# Patient Record
Sex: FEMALE | Race: WHITE | Employment: OTHER | URBAN - METROPOLITAN AREA
[De-identification: names, ages, dates, MRNs, and addresses within clinical notes are randomized per-mention and may not be internally consistent; named-entity substitution may affect disease eponyms.]

---

## 2017-01-19 ENCOUNTER — TRANSFERRED RECORDS (OUTPATIENT)
Dept: HEALTH INFORMATION MANAGEMENT | Facility: CLINIC | Age: 32
End: 2017-01-19

## 2017-01-26 ENCOUNTER — HOSPITAL ENCOUNTER (OUTPATIENT)
Dept: MAMMOGRAPHY | Facility: CLINIC | Age: 32
End: 2017-01-26
Attending: OBSTETRICS & GYNECOLOGY
Payer: COMMERCIAL

## 2017-01-26 ENCOUNTER — HOSPITAL ENCOUNTER (OUTPATIENT)
Dept: MAMMOGRAPHY | Facility: CLINIC | Age: 32
Discharge: HOME | End: 2017-01-26
Attending: OBSTETRICS & GYNECOLOGY | Admitting: OBSTETRICS & GYNECOLOGY
Payer: COMMERCIAL

## 2017-01-26 ENCOUNTER — TRANSFERRED RECORDS (OUTPATIENT)
Dept: HEALTH INFORMATION MANAGEMENT | Facility: CLINIC | Age: 32
End: 2017-01-26

## 2017-01-26 DIAGNOSIS — N63.10 LUMP OF RIGHT BREAST: ICD-10-CM

## 2017-01-26 PROCEDURE — 76642 ULTRASOUND BREAST LIMITED: CPT | Mod: RT

## 2017-01-26 PROCEDURE — G0204 DX MAMMO INCL CAD BI: HCPCS

## 2017-05-11 ENCOUNTER — TRANSFERRED RECORDS (OUTPATIENT)
Dept: HEALTH INFORMATION MANAGEMENT | Facility: CLINIC | Age: 32
End: 2017-05-11

## 2017-05-22 ENCOUNTER — OFFICE VISIT (OUTPATIENT)
Dept: FAMILY MEDICINE | Facility: CLINIC | Age: 32
End: 2017-05-22
Payer: COMMERCIAL

## 2017-05-22 VITALS
WEIGHT: 193 LBS | BODY MASS INDEX: 32.15 KG/M2 | TEMPERATURE: 97.8 F | DIASTOLIC BLOOD PRESSURE: 68 MMHG | HEART RATE: 115 BPM | SYSTOLIC BLOOD PRESSURE: 120 MMHG | HEIGHT: 65 IN | OXYGEN SATURATION: 98 %

## 2017-05-22 DIAGNOSIS — L08.9 SKIN INFECTION: Primary | ICD-10-CM

## 2017-05-22 PROCEDURE — 99213 OFFICE O/P EST LOW 20 MIN: CPT | Performed by: PHYSICIAN ASSISTANT

## 2017-05-22 RX ORDER — CEPHALEXIN 500 MG/1
500 CAPSULE ORAL 2 TIMES DAILY
Qty: 20 CAPSULE | Refills: 0 | Status: SHIPPED | OUTPATIENT
Start: 2017-05-22 | End: 2017-07-12

## 2017-05-22 NOTE — MR AVS SNAPSHOT
"              After Visit Summary   5/22/2017    Kenna Carballo    MRN: [id number]           Patient Information     Date Of Birth          1985        Visit Information        Provider Department      5/22/2017 11:20 AM Sarah Duran PA-C [facility]        Today's Diagnoses     Skin infection    -  1      Care Instructions    Continue to do warm soaks to help.     Take the antibiotic as prescribed for the next 10 days.      Please follow-up if symptoms are not improved as expected.  Be seen sooner if needed.          Follow-ups after your visit        Who to contact     If you have questions or need follow up information about today's clinic visit or your schedule please contact [facility] directly at [phone].  Normal or non-critical lab and imaging results will be communicated to you by Crocshart, letter or phone within 4 business days after the clinic has received the results. If you do not hear from us within 7 days, please contact the clinic through Crocshart or phone. If you have a critical or abnormal lab result, we will notify you by phone as soon as possible.  Submit refill requests through Wheely or call your pharmacy and they will forward the refill request to us. Please allow 3 business days for your refill to be completed.          Additional Information About Your Visit        MyChart Information     Wheely lets you send messages to your doctor, view your test results, renew your prescriptions, schedule appointments and more. To sign up, go to [email or web address] . Click on \"Log in\" on the left side of the screen, which will take you to the Welcome page. Then click on \"Sign up Now\" on the right side of the page.     You will be asked to enter the access code listed below, as well as some personal information. Please follow the directions to create your username and password.     Your access code is: [email or web address]  Expires: 8/20/2017 11:38 AM     Your " "access code will  in 90 days. If you need help or a new code, please call your [place] clinic or [phone].        Care EveryWhere ID     This is your Care EveryWhere ID. This could be used by other organizations to access your [place] medical records  [id number]        Your Vitals Were     Pulse Temperature Height Last Period Pulse Oximetry Breastfeeding?    115 97.8  F (36.6  C) (Oral) 5' 5\" (1.651 m) 05/10/2017 (Approximate) 98% No    BMI (Body Mass Index)                   32.12 kg/m2            Blood Pressure from Last 3 Encounters:   17 120/68   16 118/68   16 128/78    Weight from Last 3 Encounters:   17 193 lb (87.5 kg)   16 178 lb (80.7 kg)   16 178 lb (80.7 kg)              Today, you had the following     No orders found for display         Today's Medication Changes          These changes are accurate as of: 17 11:38 AM.  If you have any questions, ask your nurse or doctor.               Start taking these medicines.        Dose/Directions    cephALEXin 500 MG capsule   Commonly known as:  KEFLEX   Used for:  Skin infection   Started by:  Sarah Duran PA-C        Dose:  500 mg   Take 1 capsule (500 mg) by mouth 2 times daily   Quantity:  20 capsule   Refills:  0            Where to get your medicines      These medications were sent to [facility] [address] [place] [address] AT [facility] [address] & [address] [address]    Hours:  24-hours Phone:  [phone]     cephALEXin 500 MG capsule                Primary Care Provider Office Phone # Fax #    Trang Hang Alvares [phone]       [facility] [address]  [facility] [address]        Thank you!     Thank you for choosing [facility]  for your care. Our goal is always to provide you with excellent care. Hearing back from our patients is one way we can continue to improve our services. " Please take a few minutes to complete the written survey that you may receive in the mail after your visit with us. Thank you!             Your Updated Medication List - Protect others around you: Learn how to safely use, store and throw away your medicines at www.disposemymeds.org.          This list is accurate as of: 5/22/17 11:38 AM.  Always use your most recent med list.                   Brand Name Dispense Instructions for use    calcium carbonate 500 MG tablet    OS-TIAGO 500 mg [place]. Ca     Take 500 mg by mouth 2 times daily       cephALEXin 500 MG capsule    KEFLEX    20 capsule    Take 1 capsule (500 mg) by mouth 2 times daily       OMEGA-3 FISH OIL PO      Take 1 g by mouth daily       triamcinolone 0.1 % cream    KENALOG    80 g    Apply sparingly to affected area three times daily for 14 days.       UNABLE TO FIND      MEDICATION NAME: Unknown -- Birth Control Pill

## 2017-05-22 NOTE — PROGRESS NOTES
"  SUBJECTIVE:                                                    Kenna Carballo is a 31 year old female who presents to clinic today for the following health issues:      Concern - Finger problem     Onset: x1 week    Description:   Right middle finger --     Intensity: mild, moderate    Progression of Symptoms:  worsening and constant    Accompanying Signs & Symptoms:  Little discharge once after soaking, swollen, tender       Previous history of similar problem:   none    Precipitating factors:   Worsened by: pressure    Alleviating factors:  Improved by: soaked in dreft    Not sure what happened.     Therapies Tried and outcome: above      Patient reports that she has had a hangnail on her right middle finger that she thinks is now infected.  She says that it is tender to the touch.  She did get relief with a warm soak.  She reports that this started about one week ago.      Problem list and histories reviewed & adjusted, as indicated.  Additional history: as documented      ROS:  Constitutional, HEENT, cardiovascular, pulmonary, GI, , musculoskeletal, neuro, skin, endocrine and psych systems are negative, except as otherwise noted.    OBJECTIVE:                                                    /68 (BP Location: Left arm, Patient Position: Chair, Cuff Size: Adult Large)  Pulse 115  Temp 97.8  F (36.6  C) (Oral)  Ht 5' 5\" (1.651 m)  Wt 193 lb (87.5 kg)  LMP 05/10/2017 (Approximate)  SpO2 98%  Breastfeeding? No  BMI 32.12 kg/m2  Body mass index is 32.12 kg/(m^2).  GENERAL: healthy, alert and no distress  EYES: Eyes grossly normal to inspection, PERRL and conjunctivae and sclerae normal  MS: no gross musculoskeletal defects noted, no edema  SKIN: minimal redness on right middle finger along thumb side of nail bed.  No purulent drainage, no encapsulated or enclosed purulent fluid, mildly tender to touch.  No rash noted and no surrounding erythema.    NEURO: Normal strength and tone, mentation " intact and speech normal  PSYCH: mentation appears normal, affect normal/bright    Diagnostic Test Results:  none      ASSESSMENT/PLAN:                                                      Kenna was seen today for finger.    Diagnoses and all orders for this visit:    Skin infection  -     cephALEXin (KEFLEX) 500 MG capsule; Take 1 capsule (500 mg) by mouth 2 times daily      See Patient Instructions:  Continue to do warm soaks to help.     Take the antibiotic as prescribed for the next 10 days.      Please follow-up if symptoms are not improved as expected.  Be seen sooner if needed.          Sarah Duran PA-C    [facility] [place]

## 2017-05-22 NOTE — NURSING NOTE
"Chief Complaint   Patient presents with     Finger       Initial /68 (BP Location: Left arm, Patient Position: Chair, Cuff Size: Adult Large)  Pulse 115  Temp 97.8  F (36.6  C) (Oral)  Ht 5' 5\" (1.651 m)  Wt 193 lb (87.5 kg)  LMP 05/10/2017 (Approximate)  SpO2 98%  Breastfeeding? No  BMI 32.12 kg/m2 Estimated body mass index is 32.12 kg/(m^2) as calculated from the following:    Height as of this encounter: 5' 5\" (1.651 m).    Weight as of this encounter: 193 lb (87.5 kg).  Medication Reconciliation: complete   Csaba Mlnmarinak CMA    "

## 2017-05-22 NOTE — PATIENT INSTRUCTIONS
Continue to do warm soaks to help.     Take the antibiotic as prescribed for the next 10 days.      Please follow-up if symptoms are not improved as expected.  Be seen sooner if needed.

## 2017-06-28 ENCOUNTER — HOSPITAL PATHOLOGY (OUTPATIENT)
Dept: OTHER | Facility: CLINIC | Age: 32
End: 2017-06-28

## 2017-06-28 ENCOUNTER — TRANSFERRED RECORDS (OUTPATIENT)
Dept: HEALTH INFORMATION MANAGEMENT | Facility: CLINIC | Age: 32
End: 2017-06-28

## 2017-06-30 LAB — COPATH REPORT: NORMAL

## 2017-07-06 PROCEDURE — 00000346 ZZHCL STATISTIC REVIEW OUTSIDE SLIDES TC 88321: Performed by: OBSTETRICS & GYNECOLOGY

## 2017-07-12 ENCOUNTER — ONCOLOGY VISIT (OUTPATIENT)
Dept: ONCOLOGY | Facility: CLINIC | Age: 32
End: 2017-07-12
Attending: OBSTETRICS & GYNECOLOGY
Payer: COMMERCIAL

## 2017-07-12 VITALS
BODY MASS INDEX: 31.53 KG/M2 | DIASTOLIC BLOOD PRESSURE: 82 MMHG | TEMPERATURE: 98.7 F | HEART RATE: 89 BPM | OXYGEN SATURATION: 98 % | RESPIRATION RATE: 16 BRPM | WEIGHT: 189.25 LBS | HEIGHT: 65 IN | SYSTOLIC BLOOD PRESSURE: 136 MMHG

## 2017-07-12 DIAGNOSIS — F41.9 ANXIETY: Primary | ICD-10-CM

## 2017-07-12 PROCEDURE — 99205 OFFICE O/P NEW HI 60 MIN: CPT | Performed by: OBSTETRICS & GYNECOLOGY

## 2017-07-12 PROCEDURE — 99211 OFF/OP EST MAY X REQ PHY/QHP: CPT

## 2017-07-12 RX ORDER — LORAZEPAM 1 MG/1
1 TABLET ORAL ONCE
Qty: 6 TABLET | Refills: 0 | Status: SHIPPED | OUTPATIENT
Start: 2017-07-12 | End: 2017-07-12

## 2017-07-12 ASSESSMENT — PAIN SCALES - GENERAL: PAINLEVEL: NO PAIN (0)

## 2017-07-12 NOTE — MR AVS SNAPSHOT
"              After Visit Summary   7/12/2017    Kenna Carballo    MRN: [id number]           Patient Information     Date Of Birth          1985        Visit Information        Provider Department      7/12/2017 2:00 PM Jerzy Harkins MD [facility]        Today's Diagnoses     Anxiety    -  1       Follow-ups after your visit        Your next 10 appointments already scheduled     Oct 23, 2017 10:30 AM CDT   (Arrive by 10:15 AM)   Return Visit with Jerzy Harkins MD   [facility] ([facility])    [address] [place] [address] [phone]              Who to contact     If you have questions or need follow up information about today's clinic visit or your schedule please contact [facility] directly at [phone].  Normal or non-critical lab and imaging results will be communicated to you by Border Stylohart, letter or phone within 4 business days after the clinic has received the results. If you do not hear from us within 7 days, please contact the clinic through Border Stylohart or phone. If you have a critical or abnormal lab result, we will notify you by phone as soon as possible.  Submit refill requests through [employer] or call your pharmacy and they will forward the refill request to us. Please allow 3 business days for your refill to be completed.          Additional Information About Your Visit        MyChart Information     [employer] lets you send messages to your doctor, view your test results, renew your prescriptions, schedule appointments and more. To sign up, go to [email or web address] . Click on \"Log in\" on the left side of the screen, which will take you to the Welcome page. Then click on \"Sign up Now\" on the right side of the page.     You will be asked to enter the access code listed below, as well as some personal information. Please follow the directions to create " "your username and password.     Your access code is: [email or web address]  Expires: 2017 11:38 AM     Your access code will  in 90 days. If you need help or a new code, please call your [facility] or [phone].        Care EveryWhere ID     This is your Care EveryWhere ID. This could be used by other organizations to access your [place] medical records  [id number]        Your Vitals Were     Pulse Temperature Respirations Height Pulse Oximetry BMI (Body Mass Index)    89 98.7  F (37.1  C) (Tympanic) 16 1.651 m (5' 5\") 98% 31.49 kg/m2       Blood Pressure from Last 3 Encounters:   17 136/82   17 120/68   16 118/68    Weight from Last 3 Encounters:   17 85.8 kg (189 lb 4 oz)   17 87.5 kg (193 lb)   16 80.7 kg (178 lb)              Today, you had the following     No orders found for display         Today's Medication Changes          These changes are accurate as of: 17 11:59 PM.  If you have any questions, ask your nurse or doctor.               Start taking these medicines.        Dose/Directions    LORazepam 1 MG tablet   Commonly known as:  ATIVAN   Used for:  Anxiety        Dose:  1 mg   Take 1 tablet (1 mg) by mouth once for 1 dose Take 1 pill 30 mins prior to visit   Quantity:  6 tablet   Refills:  0         Stop taking these medicines if you haven't already. Please contact your care team if you have questions.     cephALEXin 500 MG capsule   Commonly known as:  KEFLEX                Where to get your medicines      Some of these will need a paper prescription and others can be bought over the counter.  Ask your nurse if you have questions.     Bring a paper prescription for each of these medications     LORazepam 1 MG tablet                Primary Care Provider Office Phone # Fax #    Trang Alvares [phone]       [facility] [address]  [facility] [address]        Equal Access to Services     RADHA HERNANDEZ AH: " Hadii aad ku hadanthonynolan Bravoali, watorieda luqadaha, qajaspreetta kaayan cortes, bert suzannabernice cuevafran demetrio. So [facility] [phone].    ATENCIÓN: Si avery kulkarni, tiene a walsh disposición servicios gratuitos de asistencia lingüística. Mtame al [phone].    We comply with applicable federal civil rights laws and [place] laws. We do not discriminate on the basis of race, color, national origin, age, disability sex, sexual orientation or gender identity.            Thank you!     Thank you for choosing [facility]  for your care. Our goal is always to provide you with excellent care. Hearing back from our patients is one way we can continue to improve our services. Please take a few minutes to complete the written survey that you may receive in the mail after your visit with us. Thank you!             Your Updated Medication List - Protect others around you: Learn how to safely use, store and throw away your medicines at www.disposemymeds.org.          This list is accurate as of: 7/12/17 11:59 PM.  Always use your most recent med list.                   Brand Name Dispense Instructions for use Diagnosis    calcium carbonate 1250 MG tablet    OS-TIAGO 500 mg [place]. Ca     Take 500 mg by mouth 2 times daily        LORazepam 1 MG tablet    ATIVAN    6 tablet    Take 1 tablet (1 mg) by mouth once for 1 dose Take 1 pill 30 mins prior to visit    Anxiety       OMEGA-3 FISH OIL PO      Take 1 g by mouth daily        triamcinolone 0.1 % cream    KENALOG    80 g    Apply sparingly to affected area three times daily for 14 days.    Eczema, unspecified eczema       UNABLE TO FIND      MEDICATION NAME: Unknown -- Birth Control Pill

## 2017-07-12 NOTE — NURSING NOTE
"Oncology Rooming Note    July 12, 2017 2:08 PM   Kenna Carballo is a 31 year old female who presents for:    Chief Complaint   Patient presents with     Oncology Clinic Visit     New patient Consult     Initial Vitals: /82  Pulse 89  Temp 98.7  F (37.1  C) (Tympanic)  Resp 16  Ht 1.651 m (5' 5\")  Wt 85.8 kg (189 lb 4 oz)  SpO2 98%  BMI 31.49 kg/m2 Estimated body mass index is 31.49 kg/(m^2) as calculated from the following:    Height as of this encounter: 1.651 m (5' 5\").    Weight as of this encounter: 85.8 kg (189 lb 4 oz). Body surface area is 1.98 meters squared.  No Pain (0) Comment: Data Unavailable   No LMP recorded.  Allergies reviewed: Yes  Medications reviewed: Yes    Medications: Medication refills not needed today.  Pharmacy [facility] entered into [employer]: [facility] [address] [place] [address] [address] AT [place] [address] & [facility]    Clinical concerns: New Patient     8 minutes for nursing intake (face to face time)     Arlette Puri CMA     DISCHARGE PLAN:  Next appointments: See patient instruction section  Departure Mode: Ambulatory  Accompanied by: self  0 minutes for nursing discharge (face to face time)   Arlette Puri CMA                  "

## 2017-07-12 NOTE — PROGRESS NOTES
Consult Notes on Referred Patient    Date: 2017       Dr. Jerri Davison MD  [facility]  [address] [address]       RE: Kenna Carballo  : 1985  CHERRY: 2017    Dear Dr. Jerri Davison:    I had the pleasure of seeing your patient Kenna Carballo here at the Gynecologic Cancer Clinic at the [facility] on 2017.  As you know she is a very pleasant 31 year old woman with a recent diagnosis of  Cervical dysplasia and AIS.  Given these findings she was subsequently sent to the Gynecologic Cancer Clinic for new patient consultation.     She presented most recently in 2017 for a routine health examination and a question of removing her Nexplanon implanted birth control.  Her examination was otherwise unremarkable with the exception of a history of a mother who  at 38 with breast cancer she has been tested for BRCA which was negative she nevertheless gets yearly mammograms.    Also her prior Pap smear was HPV positive and was retested in January.      LEEP 2017: PATH   FINAL DIAGNOSIS:   Cervix, LEEP:   - Ectocervix, transformation zone and endocervix present.   - High grade squamous intraepithelial lesion (VAISHNAVI 2 and VAISHNAVI 3/moderate   and severe dysplasia) with extension into endocervical glands and   involving all four quadrants.   - Endocervical adenocarcinoma in situ, small focus.   - Endocervical margin positive for high grade squamous intraepithelial   lesion.   - Ectocervical margin close to dysplastic epithelium.     She is interested in pursuing fertility and was planning to get pregnant in February of next year.      Review of Systems:    Systemic           no weight changes; no fever; no chills; no night sweats; no appetite changes  Skin           no rashes, or lesions  Eye           no irritation; no changes in vision  Cherise-Laryngeal           no dysphagia; no hoarseness   Pulmonary    no cough; no  shortness of breath  Cardiovascular    no chest pain; no palpitations  Gastrointestinal    no diarrhea; no constipation; no abdominal pain; no changes in bowel  habits; no blood in stool  Genitourinary   no urinary frequency; no urinary urgency; no dysuria; no pain; no abnormal vaginal discharge; no abnormal vaginal bleeding  Breast   no breast discharge; no breast changes; no breast pain  Musculoskeletal    no myalgias; no arthralgias; no back pain  Psychiatric           no depressed mood; no anxiety    Hematologic           no tender lymph nodes; no noticeable swellings or lumps   Endocrine    no hot flashes; no heat/cold intolerance         Neurological   no tremor; no numbness and tingling; no headaches; no difficulty  sleeping      Past Medical History:    Past Medical History:   Diagnosis Date     SVT (supraventricular tachycardia) (H)     s/p ablation         Past Surgical History:    Past Surgical History:   Procedure Laterality Date     CARDIAC SURGERY      ablation for svt      SECTION N/A 2015    Procedure:  SECTION;  Surgeon: Coco Cross MD;  Location: [facility]         Health Maintenance:  Health Maintenance Due   Topic Date Due     PAP SCREENING Q3 YR (SYSTEM ASSIGNED)  10/16/2004       Last Pap Smear:               Result: abnormal: see above  She has had a history of abnormal Pap smears.    Current Medications:     has a current medication list which includes the following prescription(s): UNABLE TO FIND, cephalexin, triamcinolone, calcium carbonate, and omega-3 fatty acids.       Allergies:        Allergies   Allergen Reactions     Bee Pollen Other (See Comments)     PN: Swelling at sting site     Latex             Social History:     Social History   Substance Use Topics     Smoking status: Never Smoker     Smokeless tobacco: Never Used     Alcohol use 0.0 oz/week     0 Standard drinks or equivalent per week      Comment: 2-4 per wk       History   Drug Use No  "          Family History:     The patient's family history is notable for .    Family History   Problem Relation Age of Onset     Breast Cancer Mother    anlso AUNT (65), paternal GM 45,      Physical Exam:     PS: 0  VS: /82  Pulse 89  Temp 98.7  F (37.1  C) (Tympanic)  Resp 16  Ht 1.651 m (5' 5\")  Wt 85.8 kg (189 lb 4 oz)  SpO2 98%  BMI 31.49 kg/m2    General Appearance: healthy and alert, no distress     HEENT:  no thyromegaly, no palpable nodules or masses        Cardiovascular: regular rate and rhythm, no gallops, rubs or murmurs     Respiratory: lungs clear, no rales, rhonchi or wheezes, normal diaphragmatic excursion    Musculoskeletal: extremities non tender and without edema    Skin: no lesions or rashes     Neurological: normal gait, no gross defects     Psychiatric: appropriate mood and affect                               Hematological: normal cervical, supraclavicular and inguinal lymph nodes     Gastrointestinal:       abdomen soft, non-tender, non-distended, no organomegaly or masses    Genitourinary: External genitalia and urethral meatus appears normal.  Vagina is smooth without nodularity or masses.  Cervix appears grossly normal and healing without significant event.  Bimanual exam reveal no masses, nodularity or fullness.  There is no parametrial involvement.  Recto-vaginal exam confirms these findings.      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     A total of 60 minutes was spent with the patient, 40 minutes of which were spent in counseling the patient and/or treatment planning.      Plan:     1.)   Cervical dysplasia/ASI: We had a lengthy discussion today regarding the options patient understands that with a positive margin on the LEEP specimen that it is unclear whether the disease was completely resected. The cauterized edge may have been obliterated during the " procedure or it is possible that there is additional residual disease.  This is especially relevant for AIS lesions which are no serious for skip lesions.    We therefore discussed options including hysterectomy repeat conization and observation. The patient understands that in the absence of hysterectomy complete excision mass cannot be definitively proven. A while additional resection of the transformation zone may exclude the possibility of additional squamous dysplasia at the would not eliminate the possibility of residual AIS.    After a conference discussion of the risks benefits and alternatives of hysterectomy repeat conization observation patient is inclined towards observation in this setting I have recommended a colposcopy with ECC be performed on a q.3 month basis until she becomes pregnant.    She is currently on birth control pills and is not actively attempting to become pregnant which is reasonable. She does understand that there is a small risk for progressive disease in this context. She further understands that the treatment for this is generally hysterectomy or radical hysterectomy and therefore the the actual operative strategy may not change from a prevention to treatment standpoint. She does understand that in the setting of occult adenocarcinoma in particular there could be progressive disease that is not being recognized which ultimately could negatively affect prognosis.    At the conclusion of the discussion she seems comfortable with the idea of observation but certainly is going to reconsider these options over time did discuss briefly the possibility of adoption and subsequent setting as well       2.) Genetic risk factors were assessed and the patient does not meet the qualifications for a referral.  This patient's mother was tested for BRCA given her early-onset multifocal breast cancer. She will the patient believes that she was tested with a BRCA 1 and 2 analysis and not a BRCA  panel screen. I therefore recommended she consider retesting or having her mother retested for a panel of BRCA related mutations.    3.) Labs and/or tests ordered include:  none.     4.) Health maintenance issues addressed today include none.    Thank you for allowing us to participate in the care of your patient.         Sincerely,    Jerzy Harkins        Patient Care Team:  Trang Alvares MD as PCP - General (OB/Gyn)  FRANKO RAMIREZ

## 2017-07-14 LAB — COPATH REPORT: NORMAL

## 2017-10-14 ENCOUNTER — HEALTH MAINTENANCE LETTER (OUTPATIENT)
Age: 32
End: 2017-10-14

## 2017-10-20 ASSESSMENT — ENCOUNTER SYMPTOMS
SKIN CHANGES: 0
NAIL CHANGES: 0
HOT FLASHES: 0
DECREASED LIBIDO: 0
POOR WOUND HEALING: 0

## 2017-10-23 ENCOUNTER — ONCOLOGY VISIT (OUTPATIENT)
Dept: ONCOLOGY | Facility: CLINIC | Age: 32
End: 2017-10-23
Attending: OBSTETRICS & GYNECOLOGY
Payer: COMMERCIAL

## 2017-10-23 VITALS
HEART RATE: 94 BPM | OXYGEN SATURATION: 95 % | DIASTOLIC BLOOD PRESSURE: 88 MMHG | BODY MASS INDEX: 31.73 KG/M2 | TEMPERATURE: 98 F | WEIGHT: 190.7 LBS | SYSTOLIC BLOOD PRESSURE: 143 MMHG

## 2017-10-23 DIAGNOSIS — D06.9 ADENOCARCINOMA IN SITU OF CERVIX: Primary | ICD-10-CM

## 2017-10-23 PROCEDURE — 88175 CYTOPATH C/V AUTO FLUID REDO: CPT | Performed by: OBSTETRICS & GYNECOLOGY

## 2017-10-23 PROCEDURE — 57456 ENDOCERV CURETTAGE W/SCOPE: CPT | Mod: ZF | Performed by: OBSTETRICS & GYNECOLOGY

## 2017-10-23 PROCEDURE — 88305 TISSUE EXAM BY PATHOLOGIST: CPT | Performed by: OBSTETRICS & GYNECOLOGY

## 2017-10-23 PROCEDURE — 87624 HPV HI-RISK TYP POOLED RSLT: CPT | Performed by: OBSTETRICS & GYNECOLOGY

## 2017-10-23 RX ORDER — NORETHINDRONE ACETATE AND ETHINYL ESTRADIOL AND FERROUS FUMARATE 1MG-20(21)
KIT ORAL
Refills: 1 | COMMUNITY
Start: 2017-10-05 | End: 2019-11-26

## 2017-10-23 ASSESSMENT — PAIN SCALES - GENERAL: PAINLEVEL: NO PAIN (0)

## 2017-10-23 NOTE — LETTER
10/23/2017       RE: Kenna Carballo  [address]  [facility] [address]     Dear Colleague,    Thank you for referring your patient, Kenna Carballo, to the [facility]. Please see a copy of my visit note below.                            Consult Notes on Referred Patient       MD TOBIAS English OBGYN CONSULT  [address] [facility] [address]  [place] [address]       RE: Kenna Carballo  : 1985  CHERRY: 10/23/2017      Dear Dr. Jerri Davison:    I had the pleasure of seeing your patient Kenna Carballo here at the Gynecologic Cancer Clinic at the [facility] on 2017.  As you know she is a very pleasant 32 year old woman with a recent diagnosis of cervical dysplasia and AIS.  Given these findings she was subsequently sent to the Gynecologic Cancer Clinic for new patient consultation.     She presented most recently in 2017 for a routine health examination and a question of removing her Nexplanon implanted birth control.  Her examination was otherwise unremarkable with the exception of a history of a mother who  at 38 with breast cancer.  She has been tested for BRCA which was negative she nevertheless gets yearly mammograms.    Also her prior Pap smear was HPV positive and was retested in January.      LEEP 2017: PATH   FINAL DIAGNOSIS:   Cervix, LEEP:   - Ectocervix, transformation zone and endocervix present.   - High grade squamous intraepithelial lesion (VAISHNAVI 2 and VAISHNAVI 3/moderate   and severe dysplasia) with extension into endocervical glands and   involving all four quadrants.   - Endocervical adenocarcinoma in situ, small focus.   - Endocervical margin positive for high grade squamous intraepithelial   lesion.   - Ectocervical margin close to dysplastic epithelium.     She is interested in pursuing fertility and was planning to get pregnant in February of next year.      Review of Systems:    Answers for HPI/ROS submitted by the patient on  10/20/2017   General Symptoms: No  Skin Symptoms: Yes  HENT Symptoms: No  EYE SYMPTOMS: No  HEART SYMPTOMS: No  LUNG SYMPTOMS: No  INTESTINAL SYMPTOMS: No  URINARY SYMPTOMS: No  GYNECOLOGIC SYMPTOMS: Yes  BREAST SYMPTOMS: No  SKELETAL SYMPTOMS: No  BLOOD SYMPTOMS: No  NERVOUS SYSTEM SYMPTOMS: No  MENTAL HEALTH SYMPTOMS: No  Changes in hair: No  Changes in moles/birth marks: No  Itching: Yes  Rashes: Yes  Changes in nails: No  Acne: No  Hair in places you don't want it: No  Change in facial hair: No  Warts: No  Non-healing sores: No  Scarring: No  Flaking of skin: No  Color changes of hands/feet in cold : No  Sun sensitivity: No  Skin thickening: No  Bleeding or spotting between periods: Yes  Heavy or painful periods: No  Irregular periods: No  Vaginal discharge: No  Hot flashes: No  Vaginal dryness: No  Genital ulcers: No  Reduced libido: No  Painful intercourse: No  Difficulty with sexual arousal: No  Post-menopausal bleeding: No    Past Medical History:    Past Medical History:   Diagnosis Date     SVT (supraventricular tachycardia) (H)     s/p ablation         Past Surgical History:    Past Surgical History:   Procedure Laterality Date     CARDIAC SURGERY      ablation for svt      SECTION N/A 2015    Procedure:  SECTION;  Surgeon: Coco Cross MD;  Location: [facility]         Health Maintenance:  Health Maintenance Due   Topic Date Due     PAP SCREENING Q3 YR (SYSTEM ASSIGNED)  10/16/2004     INFLUENZA VACCINE (SYSTEM ASSIGNED)  2017       Last Pap Smear:               Result: abnormal: see above  She has had a history of abnormal Pap smears.    Current Medications:     has a current medication list which includes the following prescription(s): UNABLE TO FIND, triamcinolone, calcium carbonate, omega-3 fatty acids, and microgestin fe .       Allergies:        Allergies   Allergen Reactions     Bee Pollen Other (See Comments)     PN: Swelling at sting site     Latex              Social History:     Social History   Substance Use Topics     Smoking status: Never Smoker     Smokeless tobacco: Never Used     Alcohol use 0.0 oz/week     0 Standard drinks or equivalent per week      Comment: 2-4 per wk       History   Drug Use No           Family History:     The patient's family history is notable for .    Family History   Problem Relation Age of Onset     Breast Cancer Mother    anlso AUNT (65), paternal GM 45,     Her mother (Annmarie Ochoa) genetici panel testing is reviewed and is notable for a VUS in MSH2    Physical Exam:     PS: 0  VS: /88  Pulse 94  Temp 98  F (36.7  C) (Oral)  Wt 86.5 kg (190 lb 11.2 oz)  SpO2 95%  BMI 31.73 kg/m2    General Appearance: healthy and alert, no distress     HEENT:  no thyromegaly, no palpable nodules or masses        Cardiovascular: regular rate and rhythm, no gallops, rubs or murmurs     Respiratory: lungs clear, no rales, rhonchi or wheezes, normal diaphragmatic excursion    Musculoskeletal: extremities non tender and without edema    Skin: no lesions or rashes     Neurological: normal gait, no gross defects     Psychiatric: appropriate mood and affect                               Hematological: normal cervical, supraclavicular and inguinal lymph nodes     Gastrointestinal:       abdomen soft, non-tender, non-distended, no organomegaly or masses    Genitourinary: External genitalia and urethral meatus appears normal.  Vagina is smooth without nodularity or masses.  Cervix appears grossly normal and healing without significant event.      colposcopy  With the patient in DL position the cervix and upper vagina were inspected and were noremal. Acetic acid and subsequently Lugol's were applied and there were no new areas of AWE.  An ECC was performed, but no other biopsies - Impression - gross without cancer or dysplasia.      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     A total of 30 minutes was spent with the patient, 20 minutes of which were spent in counseling the patient and/or treatment planning.      Plan:     1.)   Cervical dysplasia/ASI: We had a lengthy discussion today regarding the options patient understands that with a positive margin on the LEEP specimen that it is unclear whether the disease was completely resected. The cauterized edge may have been obliterated during the procedure or it is possible that there is additional residual disease.  This is especially relevant for AIS lesions which are no serious for skip lesions.    We therefore discussed options including hysterectomy repeat conization and observation. The patient understands that in the absence of hysterectomy complete excision mass cannot be definitively proven. A while additional resection of the transformation zone may exclude the possibility of additional squamous dysplasia at the would not eliminate the possibility of residual AIS.    After a conference discussion of the risks benefits and alternatives of hysterectomy repeat conization observation patient is inclined towards observation in this setting I have recommended a colposcopy with ECC be performed on a q3 month basis until she becomes pregnant.       2.) Genetic risk factors were assessed and the patient does not meet the qualifications for a referral.  This patient's mother was tested for BRCA given her early-onset multifocal breast cancer. She will the patient believes that she was tested with a BRCA 1 and 2 analysis and not a BRCA panel screen. I therefore recommended she consider retesting or having her mother retested for a panel of BRCA related mutations.    3.) Labs and/or tests ordered include:  none.     4.) Health maintenance issues addressed today include none.    Thank you for allowing us to participate in the care of your patient.         Sincerely,    Jerzy VIVAR  Patient  Care Team:  Trang Alvares MD as PCP - General (OB/Gyn)  FRANKO RAMIREZ

## 2017-10-23 NOTE — NURSING NOTE
"Oncology Rooming Note    October 23, 2017 10:20 AM   Kenna Carballo is a 32 year old female who presents for:    Chief Complaint   Patient presents with     Oncology Clinic Visit     3 month check, colpo     Initial Vitals: /88  Pulse 94  Temp 98  F (36.7  C) (Oral)  Wt 86.5 kg (190 lb 11.2 oz)  SpO2 95%  BMI 31.73 kg/m2 Estimated body mass index is 31.73 kg/(m^2) as calculated from the following:    Height as of 7/12/17: 1.651 m (5' 5\").    Weight as of this encounter: 86.5 kg (190 lb 11.2 oz). Body surface area is 1.99 meters squared.  No Pain (0) Comment: Data Unavailable   No LMP recorded.  Allergies reviewed: Yes  Medications reviewed: Yes    Medications: Medication refills not needed today.  Pharmacy name entered into [employer]: [facility] [address] [place] [address] [address] AT [place] [address] & [facility]    Clinical concerns:  Pregnancy test given prior to colposcopy, result negative; Dr Harkins notified.    6 minutes for nursing intake (face to face time)     Alessia Gaffney CMA              "

## 2017-10-23 NOTE — MR AVS SNAPSHOT
After Visit Summary   10/23/2017    Kenna Carballo    MRN: [id number]           Patient Information     Date Of Birth          1985        Visit Information        Provider Department      10/23/2017 10:30 AM Jerzy Harkins MD [facility]        Today's Diagnoses     Adenocarcinoma in situ of cervix    -  1       Follow-ups after your visit        Additional Services     CANCER RISK MGMT/CANCER GENETIC COUNSELING REFERRAL       Your provider has referred you to the Cancer Risk Management Program - Cancer Genetic Counseling.    Reason for Referral: mother with MSH VUS      We have a sent a notice to a staff member of the Cancer Risk Management Program to give you a call to assist with scheduling your appointment.  You may also call  [phone] ([phone]) to initiate scheduling.    Please be aware that coverage of these services is subject to the terms and limitations of your health insurance plan.  Call member services at your health plan with any benefit or coverage questions.      Please bring the completed family history sheet to your appointment in addition to any available outside medical records documenting your cancer diagnosis.                  Who to contact     If you have questions or need follow up information about today's clinic visit or your schedule please contact [facility] directly at [phone].  Normal or non-critical lab and imaging results will be communicated to you by MyChart, letter or phone within 4 business days after the clinic has received the results. If you do not hear from us within 7 days, please contact the clinic through MyChart or phone. If you have a critical or abnormal lab result, we will notify you by phone as soon as possible.  Submit refill requests through [employer] or call your pharmacy and they will forward the refill request to us. Please allow 3 business days for your refill to be  completed.          Additional Information About Your Visit        [employer] Information     "[employer] gives you secure access to your electronic health record. If you see a primary care provider, you can also send messages to your care team and make appointments. If you have questions, please call your primary care clinic.  If you do not have a primary care provider, please call [phone] and they will assist you.        Care EveryWhere ID     This is your Care EveryWhere ID. This could be used by other organizations to access your [facility] medical records  [id number]        Your Vitals Were     Pulse Temperature Pulse Oximetry BMI (Body Mass Index)          94 98  F (36.7  C) (Oral) 95% 31.73 kg/m2         Blood Pressure from Last 3 Encounters:   10/23/17 143/88   07/12/17 136/82   05/22/17 120/68    Weight from Last 3 Encounters:   10/23/17 86.5 kg (190 lb 11.2 oz)   07/12/17 85.8 kg (189 lb 4 oz)   05/22/17 87.5 kg (193 lb)              We Performed the Following     CANCER RISK MGMT/CANCER GENETIC COUNSELING REFERRAL     Colposcopy cervix with ECC     Surgical Pathology Exam        Primary Care Provider Office Phone # Fax #    Trang Hang Alvares [phone]       [facility] [address] [address]        Equal Access to Services     RADHA HERNANDEZ : Hadii aad ku hadasho Soomaali, waaxda luqadaha, qaybta kaalmada adeegashleyda, bert atkinson. So [facility] [phone].    ATENCIÓN: Si habla español, tiene a walsh disposición servicios gratuitos de asistencia lingüística. Llame al [phone].    We comply with applicable federal civil rights laws and [place] laws. We do not discriminate on the basis of race, color, national origin, age, disability, sex, sexual orientation, or gender identity.            Thank you!     Thank you for choosing [facility]  for your care. Our goal is always to provide you with excellent care.  Hearing back from our patients is one way we can continue to improve our services. Please take a few minutes to complete the written survey that you may receive in the mail after your visit with us. Thank you!             Your Updated Medication List - Protect others around you: Learn how to safely use, store and throw away your medicines at www.disposemymeds.org.          This list is accurate as of: 10/23/17 11:19 AM.  Always use your most recent med list.                   Brand Name Dispense Instructions for use Diagnosis    calcium carbonate 1250 MG tablet    OS-TIAGO 500 mg [place]. Ca     Take 500 mg by mouth 2 times daily        MICROGESTIN FE 1/20 1-20 MG-MCG per tablet   Generic drug:  norethindrone-ethinyl estradiol      TK 1 T PO QD        OMEGA-3 FISH OIL PO      Take 1 g by mouth daily        triamcinolone 0.1 % cream    KENALOG    80 g    Apply sparingly to affected area three times daily for 14 days.    Eczema, unspecified eczema       UNABLE TO FIND      MEDICATION NAME: Unknown -- Birth Control Pill

## 2017-10-23 NOTE — PROGRESS NOTES
Consult Notes on Referred Patient       Dr. Jerri Davison MD  [facility] OBGYN CONSULT  [address] [address]       RE: Kenna Carballo  : 1985  CHERRY: 10/23/2017      Dear Dr. Jerri Davison:    I had the pleasure of seeing your patient Kenna Carballo here at the Gynecologic Cancer Clinic at the [facility] on 2017.  As you know she is a very pleasant 32 year old woman with a recent diagnosis of cervical dysplasia and AIS.  Given these findings she was subsequently sent to the Gynecologic Cancer Clinic for new patient consultation.     She presented most recently in 2017 for a routine health examination and a question of removing her Nexplanon implanted birth control.  Her examination was otherwise unremarkable with the exception of a history of a mother who  at 38 with breast cancer.  She has been tested for BRCA which was negative she nevertheless gets yearly mammograms.    Also her prior Pap smear was HPV positive and was retested in January.      LEEP 2017: PATH   FINAL DIAGNOSIS:   Cervix, LEEP:   - Ectocervix, transformation zone and endocervix present.   - High grade squamous intraepithelial lesion (VAISHNAVI 2 and VAISHNAVI 3/moderate   and severe dysplasia) with extension into endocervical glands and   involving all four quadrants.   - Endocervical adenocarcinoma in situ, small focus.   - Endocervical margin positive for high grade squamous intraepithelial   lesion.   - Ectocervical margin close to dysplastic epithelium.     She is interested in pursuing fertility and was planning to get pregnant in February of next year.      Review of Systems:    Answers for HPI/ROS submitted by the patient on 10/20/2017   General Symptoms: No  Skin Symptoms: Yes  HENT Symptoms: No  EYE SYMPTOMS: No  HEART SYMPTOMS: No  LUNG SYMPTOMS: No  INTESTINAL SYMPTOMS: No  URINARY SYMPTOMS: No  GYNECOLOGIC SYMPTOMS: Yes  BREAST SYMPTOMS: No  SKELETAL  SYMPTOMS: No  BLOOD SYMPTOMS: No  NERVOUS SYSTEM SYMPTOMS: No  MENTAL HEALTH SYMPTOMS: No  Changes in hair: No  Changes in moles/birth marks: No  Itching: Yes  Rashes: Yes  Changes in nails: No  Acne: No  Hair in places you don't want it: No  Change in facial hair: No  Warts: No  Non-healing sores: No  Scarring: No  Flaking of skin: No  Color changes of hands/feet in cold : No  Sun sensitivity: No  Skin thickening: No  Bleeding or spotting between periods: Yes  Heavy or painful periods: No  Irregular periods: No  Vaginal discharge: No  Hot flashes: No  Vaginal dryness: No  Genital ulcers: No  Reduced libido: No  Painful intercourse: No  Difficulty with sexual arousal: No  Post-menopausal bleeding: No    Past Medical History:    Past Medical History:   Diagnosis Date     SVT (supraventricular tachycardia) (H)     s/p ablation         Past Surgical History:    Past Surgical History:   Procedure Laterality Date     CARDIAC SURGERY      ablation for svt      SECTION N/A 2015    Procedure:  SECTION;  Surgeon: Coco Cross MD;  [facility]: [facility]         Health Maintenance:  Health Maintenance Due   Topic Date Due     PAP SCREENING Q3 YR (SYSTEM ASSIGNED)  10/16/2004     INFLUENZA VACCINE (SYSTEM ASSIGNED)  2017       Last Pap Smear:               Result: abnormal: see above  She has had a history of abnormal Pap smears.    Current Medications:     has a current medication list which includes the following prescription(s): UNABLE TO FIND, triamcinolone, calcium carbonate, omega-3 fatty acids, and microgestin fe .       Allergies:        Allergies   Allergen Reactions     Bee Pollen Other (See Comments)     PN: Swelling at sting site     Latex             Social History:     Social History   Substance Use Topics     Smoking status: Never Smoker     Smokeless tobacco: Never Used     Alcohol use 0.0 oz/week     0 Standard drinks or equivalent per week      Comment: 2-4 per wk        History   Drug Use No           Family History:     The patient's family history is notable for .    Family History   Problem Relation Age of Onset     Breast Cancer Mother    anlso AUNT (65), paternal GM 45,     Her mother (Annmarie Ochoa) genetici panel testing is reviewed and is notable for a VUS in MSH2    Physical Exam:     PS: 0  VS: /88  Pulse 94  Temp 98  F (36.7  C) (Oral)  Wt 86.5 kg (190 lb 11.2 oz)  SpO2 95%  BMI 31.73 kg/m2    General Appearance: healthy and alert, no distress     HEENT:  no thyromegaly, no palpable nodules or masses        Cardiovascular: regular rate and rhythm, no gallops, rubs or murmurs     Respiratory: lungs clear, no rales, rhonchi or wheezes, normal diaphragmatic excursion    Musculoskeletal: extremities non tender and without edema    Skin: no lesions or rashes     Neurological: normal gait, no gross defects     Psychiatric: appropriate mood and affect                               Hematological: normal cervical, supraclavicular and inguinal lymph nodes     Gastrointestinal:       abdomen soft, non-tender, non-distended, no organomegaly or masses    Genitourinary: External genitalia and urethral meatus appears normal.  Vagina is smooth without nodularity or masses.  Cervix appears grossly normal and healing without significant event.      colposcopy  With the patient in DL position the cervix and upper vagina were inspected and were noremal. Acetic acid and subsequently Lugol's were applied and there were no new areas of AWE.  An ECC was performed, but no other biopsies - Impression - gross without cancer or dysplasia.      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     A total of 30 minutes was spent with the patient, 20 minutes of which were spent in counseling the patient and/or treatment planning.      Plan:     1.)   Cervical dysplasia/ASI: We had a lengthy  discussion today regarding the options patient understands that with a positive margin on the LEEP specimen that it is unclear whether the disease was completely resected. The cauterized edge may have been obliterated during the procedure or it is possible that there is additional residual disease.  This is especially relevant for AIS lesions which are no serious for skip lesions.    We therefore discussed options including hysterectomy repeat conization and observation. The patient understands that in the absence of hysterectomy complete excision mass cannot be definitively proven. A while additional resection of the transformation zone may exclude the possibility of additional squamous dysplasia at the would not eliminate the possibility of residual AIS.    After a conference discussion of the risks benefits and alternatives of hysterectomy repeat conization observation patient is inclined towards observation in this setting I have recommended a colposcopy with ECC be performed on a q3 month basis until she becomes pregnant.       2.) Genetic risk factors were assessed and the patient does not meet the qualifications for a referral.  This patient's mother was tested for BRCA given her early-onset multifocal breast cancer. She will the patient believes that she was tested with a BRCA 1 and 2 analysis and not a BRCA panel screen. I therefore recommended she consider retesting or having her mother retested for a panel of BRCA related mutations.    3.) Labs and/or tests ordered include:  none.     4.) Health maintenance issues addressed today include none.    Thank you for allowing us to participate in the care of your patient.         Sincerely,    Jerzy VIVAR  Patient Care Team:  Trang Alvares MD as PCP - General (OB/Gyn)  FRANKO RAMIREZ

## 2017-10-24 LAB — COPATH REPORT: NORMAL

## 2017-10-25 ENCOUNTER — TELEPHONE (OUTPATIENT)
Dept: ONCOLOGY | Facility: CLINIC | Age: 32
End: 2017-10-25

## 2017-10-26 LAB
COPATH REPORT: ABNORMAL
PAP: ABNORMAL

## 2017-10-27 LAB
FINAL DIAGNOSIS: NORMAL
HPV HR 12 DNA CVX QL NAA+PROBE: NEGATIVE
HPV16 DNA SPEC QL NAA+PROBE: NEGATIVE
HPV18 DNA SPEC QL NAA+PROBE: NEGATIVE
SPECIMEN DESCRIPTION: NORMAL

## 2017-10-27 NOTE — TELEPHONE ENCOUNTER
Pt left message on voice mail questions on biopsy results  10/25  630 pm and 730pm left message on pt voice mail  10/26 908 and 516 pm  Pt returned call left message on voicemail    10/26  715pm   Received: 10/23/2017   Reported: 10/24/2017 16:52   Ordering Phy(s): SHANTELLE TOVAR   FINAL DIAGNOSIS:   ENDOCERVICAL CURETTINGS:   - Endocervical mucosa with squamous metaplasia, inflammatory and   reactive changes   - Negative for dysplasia or malignancy   Collected: 10/23/2017   Received: 10/24/2017   Reported: 10/26/2017 16:02   Ordering Phy(s): SHANTELLE TOVAR   sPECIMEN/STAIN PROCESS:   Pap Imaged thin layer prep diagnostic (SurePath, FocalPoint with guided   screening)   CYTOLOGIC INTERPRETATION:   Epithelial cell abnormality:  squamous cell:  atypical squamous cells-of   undetermined significance (ASC-US).    Spoke with pt discussed results and follow up plan  Explained what ASCUS meant and how abnormal cells develop, how they are treated and follow plans  I explained to that we would watch this every 3 months to make sure there is nothing progressing to a cancerous cell until pt is ready for definitive treatment which is hysterectomy  Pt still wants a pregnancy and will plan to move forward with this in January which would be 6 months from last leep, explained why this is important and how leep affects the cervix and what cervix does in pregnancy  Pt reports understanding

## 2017-12-04 RX ORDER — TRIAMCINOLONE ACETONIDE 1 MG/G
CREAM TOPICAL
Qty: 80 G | Refills: 11 | Status: CANCELLED | OUTPATIENT
Start: 2017-12-04

## 2017-12-07 NOTE — TELEPHONE ENCOUNTER
Attempt #1  Called patient @ [phone] - Left a non-detailed message to call back and speak with any triage nurse.    Sunshine Tapia RN  [facility]

## 2017-12-07 NOTE — TELEPHONE ENCOUNTER
Patient said she made an appointment with dermatology and has had this taken care of.    Mohini Fragoso, BS, RN, PHN  [facility]) [phone]

## 2018-02-25 NOTE — PROGRESS NOTES
Follow Up Notes on Referred Patient       Dr. Jerri Davison MD  [facility]  [address] [place] [id number]       RE: Kenna Carballo  : 1985  CHERRY: 2018       Dear Dr. Jerri Davison:    I had the pleasure of seeing your patient Kenna Carballo here at the Gynecologic Cancer Clinic at the [facility] on 2018.  As you know she is a very pleasant 32 year old woman with a recent diagnosis of cervical dysplasia and AIS.  Given these findings she was subsequently sent to the Gynecologic Cancer Clinic for patient care and follows up. 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 Most recently in 10/23/17, she had negative HPV with ASCUS Pap and negative ECC. She now presents for 3 month recheck colposcopy and ECC. She is interested in fertility and if the results from today are reassuring, planning on attempting pregnancy.    LEEP 2017: PATH   FINAL DIAGNOSIS:   Cervix, LEEP:   - Ectocervix, transformation zone and endocervix present.   - High grade squamous intraepithelial lesion (VAISHNAVI 2 and VAISHNAVI 3/moderate   and severe dysplasia) with extension into endocervical glands and   involving all four quadrants.   - Endocervical adenocarcinoma in situ, small focus.   - Endocervical margin positive for high grade squamous intraepithelial   lesion.   - Ectocervical margin close to dysplastic epithelium.       Past Medical History:    Past Medical History:   Diagnosis Date     SVT (supraventricular tachycardia) (H)     s/p ablation       Past Surgical History:    Past Surgical History:   Procedure Laterality Date     CARDIAC SURGERY      ablation for svt      SECTION N/A 2015    Procedure:  SECTION;  Surgeon: Coco Cross MD;  Location: [facility]         Health Maintenance:  Health Maintenance Due   Topic Date Due     INFLUENZA VACCINE (SYSTEM ASSIGNED)  2017     MAMMO Q1 YR  2018        Last Pap Smear: 2017              Result: abnormal: see above  She has had a history of abnormal Pap smears.    Current Medications:     has a current medication list which includes the following prescription(s): microgestin fe 1/20, UNABLE TO FIND, triamcinolone, calcium carbonate, and omega-3 fatty acids.       Allergies:        Allergies   Allergen Reactions     Bee Pollen Other (See Comments)     PN: Swelling at sting site     Latex             Social History:     Social History   Substance Use Topics     Smoking status: Never Smoker     Smokeless tobacco: Never Used     Alcohol use 0.0 oz/week     0 Standard drinks or equivalent per week      Comment: 2-4 per wk       History   Drug Use No           Family History:     The patient's family history is notable for .    Family History   Problem Relation Age of Onset     Breast Cancer Mother    anlso AUNT (65), paternal GM 45,     Her mother (Annmarie Ochoa) genetic panel testing is reviewed and is notable for a VUS in MSH2    Physical Exam:     PS: 0  /85  Pulse 107  Temp 98.5  F (36.9  C) (Oral)  Resp 16  Wt 85.3 kg (188 lb)  LMP 02/23/2018  SpO2 98%  BMI 31.28 kg/m2    General Appearance: healthy and alert, no distress     Musculoskeletal: extremities non tender and without edema    Neurological: normal gait, no gross defects     Psychiatric: appropriate mood and affect                               Hematological: normal cervical, supraclavicular and inguinal lymph nodes     Gastrointestinal:       abdomen soft, non-tender, non-distended, no organomegaly or masses    Genitourinary: External genitalia and urethral meatus appears normal.  Vagina is smooth without nodularity or masses.  Cervix appears grossly normal and retroverted and healing without significant event.      Colposcopy  With the patient in DL position the cervix and upper vagina were inspected and were grossly normal. Acetic acid and subsequently Lugol's were applied and there were  no new areas of AWE or decreased uptake. Satisfactory colposcopy. An ECC was performed, but no other biopsies - Impression - Negative without cancer or dysplasia.      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     A total of 30 minutes was spent with the patient, 20 minutes of which were spent in counseling the patient and/or treatment planning.      Plan:     1.)    Cervical dysplasia/ASI:   After a conference discussion of the risks benefits and alternatives of hysterectomy versus repeat conization versus observation patient is inclined towards observation in this setting as she had most recent ECC negative and desires fertility. A colposcopy and ECC is recommended on a Q3-6 month basis until she becomes pregnant. If results normal from today, patient will attempt pregnancy.    We also discussed options including hysterectomy versus repeat conization and observation. The patient understands that in the absence of hysterectomy complete excision mass cannot be definitively proven.       2.) Genetic risk factors were assessed and the patient's mother was tested for BRCA given her early-onset multifocal breast cancer. The patient believes that she was tested with a BRCA 1 and 2 analysis and not a BRCA panel screen. I therefore recommended she consider retesting or having her mother retested for a panel of BRCA related mutations.    3.) Labs and/or tests ordered include:  none.     4.) Health maintenance issues addressed today include none. She will return in 3 weeks for repeat ECC and colposcopy if not pregnant. If pregnant, will do just colposcopy and only a threshold if concerning lesion.    Thank you for allowing us to participate in the care of your patient.         Sincerely,  Jerzy Harkins    I have examined this patient with our resident who acted as as scribe and agree with the above findings and plan.    Jerzy  Fadi Harkins    Patient seen and discussed with Dr. Shahana Patten MD   OBGYN, PGY-3  2/26/2018 12:59 PM        CC  Patient Care Team:  Trang Alvares MD as PCP - General (OB/Gyn)  Shahana, Jerzy Aponte MD as PCP - Obstetrics/Gynecology (Oncology)  Christina, Jyoti MENDIETA RN as Registered Nurse  FRANKO RAMIREZ        Answers for HPI/ROS submitted by the patient on 2/16/2018   General Symptoms: No  Skin Symptoms: No  HENT Symptoms: No  EYE SYMPTOMS: No  HEART SYMPTOMS: No  LUNG SYMPTOMS: No  INTESTINAL SYMPTOMS: No  URINARY SYMPTOMS: No  GYNECOLOGIC SYMPTOMS: No  BREAST SYMPTOMS: No  SKELETAL SYMPTOMS: No  BLOOD SYMPTOMS: No  NERVOUS SYSTEM SYMPTOMS: No  MENTAL HEALTH SYMPTOMS: No

## 2018-02-26 ENCOUNTER — OFFICE VISIT (OUTPATIENT)
Dept: ONCOLOGY | Facility: CLINIC | Age: 33
End: 2018-02-26
Attending: OBSTETRICS & GYNECOLOGY
Payer: COMMERCIAL

## 2018-02-26 VITALS
WEIGHT: 188 LBS | OXYGEN SATURATION: 98 % | HEART RATE: 107 BPM | BODY MASS INDEX: 31.28 KG/M2 | RESPIRATION RATE: 16 BRPM | SYSTOLIC BLOOD PRESSURE: 138 MMHG | TEMPERATURE: 98.5 F | DIASTOLIC BLOOD PRESSURE: 85 MMHG

## 2018-02-26 DIAGNOSIS — N87.9 CERVICAL DYSPLASIA: ICD-10-CM

## 2018-02-26 DIAGNOSIS — N87.9 CERVICAL DYSPLASIA: Primary | ICD-10-CM

## 2018-02-26 LAB
HCG SERPL QL: NEGATIVE
HCG UR QL: NEGATIVE

## 2018-02-26 PROCEDURE — 57456 ENDOCERV CURETTAGE W/SCOPE: CPT | Mod: ZF | Performed by: OBSTETRICS & GYNECOLOGY

## 2018-02-26 PROCEDURE — 81025 URINE PREGNANCY TEST: CPT | Performed by: OBSTETRICS & GYNECOLOGY

## 2018-02-26 PROCEDURE — G0463 HOSPITAL OUTPT CLINIC VISIT: HCPCS | Mod: ZF

## 2018-02-26 PROCEDURE — 84703 CHORIONIC GONADOTROPIN ASSAY: CPT | Performed by: OBSTETRICS & GYNECOLOGY

## 2018-02-26 PROCEDURE — 88305 TISSUE EXAM BY PATHOLOGIST: CPT | Performed by: OBSTETRICS & GYNECOLOGY

## 2018-02-26 RX ORDER — LORAZEPAM 1 MG/1
TABLET ORAL
Refills: 0 | COMMUNITY
Start: 2017-10-19 | End: 2019-05-29

## 2018-02-26 RX ORDER — FLUOCINONIDE 0.5 MG/G
CREAM TOPICAL
Refills: 1 | COMMUNITY
Start: 2017-12-05 | End: 2019-11-26

## 2018-02-26 ASSESSMENT — PAIN SCALES - GENERAL
PAINLEVEL: NO PAIN (0)
PAINLEVEL: MILD PAIN (2)

## 2018-02-26 NOTE — NURSING NOTE
"Oncology Rooming Note    February 26, 2018 12:06 PM   Kenna Carballo is a 32 year old female who presents for:    Chief Complaint   Patient presents with     Oncology Clinic Visit     Colopscopy     Initial Vitals: /85  Pulse 107  Temp 98.5  F (36.9  C) (Oral)  Resp 16  Wt 85.3 kg (188 lb)  LMP 02/23/2018  SpO2 98%  BMI 31.28 kg/m2 Estimated body mass index is 31.28 kg/(m^2) as calculated from the following:    Height as of 7/12/17: 1.651 m (5' 5\").    Weight as of this encounter: 85.3 kg (188 lb). Body surface area is 1.98 meters squared.  No Pain (0) Comment: Data Unavailable   Patient's last menstrual period was 02/23/2018.  Allergies reviewed: Yes  Medications reviewed: Yes    Medications: Medication refills not needed today.  Pharmacy name entered into [employer]: [facility] [address] [place] [address] [address] AT [place] [address] & [facility]    Clinical concerns: Pt would like Rx for anti-anxiety medication, she states the lorazepam did not help her.  Dr Harkins was notified.    8 minutes for nursing intake (face to face time)     Alessia Gaffney CMA              "

## 2018-02-26 NOTE — LETTER
2018       RE: Kenna Carballo  [address]  [facility] [address]     Dear Colleague,    Thank you for referring your patient, Kenna Carballo, to the [facility]. Please see a copy of my visit note below.                          Follow Up Notes on Referred Patient       MD [facility]  [address] [facility] [address]  [place] [address]       RE: Kenna Carballo  : 1985  CHERRY: 2018       Dear Dr. Jerri Davison:    I had the pleasure of seeing your patient Kenna Carballo here at the Gynecologic Cancer Clinic at the [facility] on 2018.  As you know she is a very pleasant 32 year old woman with a recent diagnosis of cervical dysplasia and AIS.  Given these findings she was subsequently sent to the Gynecologic Cancer Clinic for patient care and follows up. 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 Most recently in 10/23/17, she had negative HPV with ASCUS Pap and negative ECC. She now presents for 3 month recheck colposcopy and ECC. She is interested in fertility and if the results from today are reassuring, planning on attempting pregnancy.    LEEP 2017: PATH   FINAL DIAGNOSIS:   Cervix, LEEP:   - Ectocervix, transformation zone and endocervix present.   - High grade squamous intraepithelial lesion (VAISHNAVI 2 and VAISHNAVI 3/moderate   and severe dysplasia) with extension into endocervical glands and   involving all four quadrants.   - Endocervical adenocarcinoma in situ, small focus.   - Endocervical margin positive for high grade squamous intraepithelial   lesion.   - Ectocervical margin close to dysplastic epithelium.       Past Medical History:    Past Medical History:   Diagnosis Date     SVT (supraventricular tachycardia) (H)     s/p ablation       Past Surgical History:    Past Surgical History:   Procedure Laterality Date     CARDIAC SURGERY      ablation for svt      SECTION N/A 2015     Procedure:  SECTION;  Surgeon: Coco Cross MD;  Location: [facility]         Health Maintenance:  Health Maintenance Due   Topic Date Due     INFLUENZA VACCINE (SYSTEM ASSIGNED)  2017     MAMMO Q1 YR  2018       Last Pap Smear:               Result: abnormal: see above  She has had a history of abnormal Pap smears.    Current Medications:     has a current medication list which includes the following prescription(s): microgestin fe , UNABLE TO FIND, triamcinolone, calcium carbonate, and omega-3 fatty acids.       Allergies:        Allergies   Allergen Reactions     Bee Pollen Other (See Comments)     PN: Swelling at sting site     Latex             Social History:     Social History   Substance Use Topics     Smoking status: Never Smoker     Smokeless tobacco: Never Used     Alcohol use 0.0 oz/week     0 Standard drinks or equivalent per week      Comment: 2-4 per wk       History   Drug Use No           Family History:     The patient's family history is notable for .    Family History   Problem Relation Age of Onset     Breast Cancer Mother    anlso AUNT (65), paternal GM 45,     Her mother (Annmarie Wests) genetic panel testing is reviewed and is notable for a VUS in MSH2    Physical Exam:     PS: 0  /85  Pulse 107  Temp 98.5  F (36.9  C) (Oral)  Resp 16  Wt 85.3 kg (188 lb)  LMP 2018  SpO2 98%  BMI 31.28 kg/m2    General Appearance: healthy and alert, no distress     Musculoskeletal: extremities non tender and without edema    Neurological: normal gait, no gross defects     Psychiatric: appropriate mood and affect                               Hematological: normal cervical, supraclavicular and inguinal lymph nodes     Gastrointestinal:       abdomen soft, non-tender, non-distended, no organomegaly or masses    Genitourinary: External genitalia and urethral meatus appears normal.  Vagina is smooth without nodularity or masses.  Cervix appears grossly normal  and retroverted and healing without significant event.      Colposcopy  With the patient in DL position the cervix and upper vagina were inspected and were grossly normal. Acetic acid and subsequently Lugol's were applied and there were no new areas of AWE or decreased uptake. Satisfactory colposcopy. An ECC was performed, but no other biopsies - Impression - Negative without cancer or dysplasia.      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     A total of 30 minutes was spent with the patient, 20 minutes of which were spent in counseling the patient and/or treatment planning.      Plan:     1.)    Cervical dysplasia/ASI:   After a conference discussion of the risks benefits and alternatives of hysterectomy versus repeat conization versus observation patient is inclined towards observation in this setting as she had most recent ECC negative and desires fertility. A colposcopy and ECC is recommended on a Q3-6 month basis until she becomes pregnant. If results normal from today, patient will attempt pregnancy.    We also discussed options including hysterectomy versus repeat conization and observation. The patient understands that in the absence of hysterectomy complete excision mass cannot be definitively proven.       2.) Genetic risk factors were assessed and the patient's mother was tested for BRCA given her early-onset multifocal breast cancer. The patient believes that she was tested with a BRCA 1 and 2 analysis and not a BRCA panel screen. I therefore recommended she consider retesting or having her mother retested for a panel of BRCA related mutations.    3.) Labs and/or tests ordered include:  none.     4.) Health maintenance issues addressed today include none. She will return in 3 weeks for repeat ECC and colposcopy if not pregnant. If pregnant, will do just colposcopy and only a threshold if concerning  lesion.    Thank you for allowing us to participate in the care of your patient.         Sincerely,  Jerzy Harkins    I have examined this patient with our resident who acted as as scribe and agree with the above findings and plan.    Jerzy Harkins    Patient seen and discussed with Dr. Shahana Patten MD   OBGYN, PGY-3  2/26/2018 12:59 PM      CC  Patient Care Team:  Trang Alvares MD as PCP - General (OB/Gyn)  Jyoti Vasquez RN as Registered Nurse  FRANKO RAMIREZ

## 2018-02-26 NOTE — MR AVS SNAPSHOT
After Visit Summary   2/26/2018    Kenna Carballo    MRN: [id number]           Patient Information     Date Of Birth          1985        Visit Information        Provider Department      2/26/2018 12:00 PM Jerzy Harkins MD [facility]        Today's Diagnoses     Cervical dysplasia    -  1       Follow-ups after your visit        Who to contact     If you have questions or need follow up information about today's clinic visit or your schedule please contact [facility] directly at [phone].  Normal or non-critical lab and imaging results will be communicated to you by [employer], letter or phone within 4 business days after the clinic has received the results. If you do not hear from us within 7 days, please contact the clinic through [employer] or phone. If you have a critical or abnormal lab result, we will notify you by phone as soon as possible.  Submit refill requests through [employer] or call your pharmacy and they will forward the refill request to us. Please allow 3 business days for your refill to be completed.          Additional Information About Your Visit        MyChart Information     [employer] gives you secure access to your electronic health record. If you see a primary care provider, you can also send messages to your care team and make appointments. If you have questions, please call your primary care clinic.  If you do not have a primary care provider, please call [phone] and they will assist you.        Care EveryWhere ID     This is your Care EveryWhere ID. This could be used by other organizations to access your [facility] medical records  [id number]        Your Vitals Were     Pulse Temperature Respirations Last Period Pulse Oximetry BMI (Body Mass Index)    107 98.5  F (36.9  C) (Oral) 16 02/23/2018 98% 31.28 kg/m2       Blood Pressure from Last 3 Encounters:   02/26/18 138/85   10/23/17 143/88   07/12/17 136/82    Weight  from Last 3 Encounters:   02/26/18 85.3 kg (188 lb)   10/23/17 86.5 kg (190 lb 11.2 oz)   07/12/17 85.8 kg (189 lb 4 oz)              We Performed the Following     Colposcopy cervix with ECC     HCG qualitative urine     HCG qualitative     Surgical Pathology Exam        Primary Care Provider Office Phone # Fax #    Trang Hang Alvares [phone]       [facility] [address] [facility] [address]  [facility] [address]        Equal Access to Services     [facility]: Hadii aad ku hadasho Soomaali, waaxda luqadaha, qaybta kaalmada adeegyada, waxjinny bundy haytabatha martinez . So [facility] [phone].    ATENCIÓN: Si habla español, tiene a walsh disposición servicios gratuitos de asistencia lingüística. [facility] [phone].    We comply with applicable federal civil rights laws and [place] laws. We do not discriminate on the basis of race, color, national origin, age, disability, sex, sexual orientation, or gender identity.            Thank you!     Thank you for choosing [facility]  for your care. Our goal is always to provide you with excellent care. Hearing back from our patients is one way we can continue to improve our services. Please take a few minutes to complete the written survey that you may receive in the mail after your visit with us. Thank you!             Your Updated Medication List - Protect others around you: Learn how to safely use, store and throw away your medicines at www.disposemymeds.org.          This list is accurate as of 2/26/18  1:24 PM.  Always use your most recent med list.                   Brand Name Dispense Instructions for use Diagnosis    calcium carbonate 1250 MG tablet    OS-TIAGO 500 mg [facility] [place]. Ca     Take 500 mg by mouth 2 times daily        fluocinonide 0.05 % cream    LIDEX     APPLY TO ECZEMA ON BODY BID PRN        LORazepam 1 MG tablet    ATIVAN     TK 1 T PO 30 MINUTES PRIOR TO VISIT        MICROGESTIN FE 1/20 1-20 MG-MCG per tablet    Generic drug:  norethindrone-ethinyl estradiol      TK 1 T PO QD        OMEGA-3 FISH OIL PO      Take 1 g by mouth daily        triamcinolone 0.1 % cream    KENALOG    80 g    Apply sparingly to affected area three times daily for 14 days.    Eczema, unspecified eczema       UNABLE TO FIND      MEDICATION NAME: Unknown -- Birth Control Pill

## 2018-02-28 LAB — COPATH REPORT: NORMAL

## 2018-03-06 ENCOUNTER — HOSPITAL ENCOUNTER (EMERGENCY)
Facility: CLINIC | Age: 33
Discharge: HOME | End: 2018-03-06
Attending: EMERGENCY MEDICINE | Admitting: EMERGENCY MEDICINE
Payer: COMMERCIAL

## 2018-03-06 ENCOUNTER — APPOINTMENT (OUTPATIENT)
Dept: GENERAL RADIOLOGY | Facility: CLINIC | Age: 33
End: 2018-03-06
Attending: EMERGENCY MEDICINE
Payer: COMMERCIAL

## 2018-03-06 VITALS
RESPIRATION RATE: 16 BRPM | DIASTOLIC BLOOD PRESSURE: 81 MMHG | OXYGEN SATURATION: 98 % | SYSTOLIC BLOOD PRESSURE: 131 MMHG | TEMPERATURE: 97.6 F

## 2018-03-06 DIAGNOSIS — R07.9 ACUTE CHEST PAIN: ICD-10-CM

## 2018-03-06 LAB
ANION GAP SERPL CALCULATED.3IONS-SCNC: 9 MMOL/L (ref 3–14)
BASOPHILS # BLD AUTO: 0.1 10E9/L (ref 0–0.2)
BASOPHILS NFR BLD AUTO: 1.1 %
BUN SERPL-MCNC: 16 MG/DL (ref 7–30)
CALCIUM SERPL-MCNC: 8.5 MG/DL (ref 8.5–10.1)
CHLORIDE SERPL-SCNC: 104 MMOL/L (ref 94–109)
CO2 SERPL-SCNC: 21 MMOL/L (ref 20–32)
CREAT SERPL-MCNC: 0.52 MG/DL (ref 0.52–1.04)
D DIMER PPP FEU-MCNC: <0.3 UG/ML FEU (ref 0–0.5)
DIFFERENTIAL METHOD BLD: NORMAL
EOSINOPHIL # BLD AUTO: 0.2 10E9/L (ref 0–0.7)
EOSINOPHIL NFR BLD AUTO: 2.5 %
ERYTHROCYTE [DISTWIDTH] IN BLOOD BY AUTOMATED COUNT: 12.9 % (ref 10–15)
GFR SERPL CREATININE-BSD FRML MDRD: >90 ML/MIN/1.7M2
GLUCOSE SERPL-MCNC: 90 MG/DL (ref 70–99)
HCT VFR BLD AUTO: 40.2 % (ref 35–47)
HGB BLD-MCNC: 13.2 G/DL (ref 11.7–15.7)
IMM GRANULOCYTES # BLD: 0 10E9/L (ref 0–0.4)
IMM GRANULOCYTES NFR BLD: 0.4 %
INTERPRETATION ECG - MUSE: NORMAL
LYMPHOCYTES # BLD AUTO: 2.3 10E9/L (ref 0.8–5.3)
LYMPHOCYTES NFR BLD AUTO: 27.4 %
MCH RBC QN AUTO: 31.6 PG (ref 26.5–33)
MCHC RBC AUTO-ENTMCNC: 32.8 G/DL (ref 31.5–36.5)
MCV RBC AUTO: 96 FL (ref 78–100)
MONOCYTES # BLD AUTO: 0.6 10E9/L (ref 0–1.3)
MONOCYTES NFR BLD AUTO: 6.7 %
NEUTROPHILS # BLD AUTO: 5.3 10E9/L (ref 1.6–8.3)
NEUTROPHILS NFR BLD AUTO: 61.9 %
NRBC # BLD AUTO: 0 10*3/UL
NRBC BLD AUTO-RTO: 0 /100
PLATELET # BLD AUTO: 274 10E9/L (ref 150–450)
POTASSIUM SERPL-SCNC: 3.9 MMOL/L (ref 3.4–5.3)
RBC # BLD AUTO: 4.18 10E12/L (ref 3.8–5.2)
SODIUM SERPL-SCNC: 134 MMOL/L (ref 133–144)
TROPONIN I SERPL-MCNC: <0.015 UG/L (ref 0–0.04)
TROPONIN I SERPL-MCNC: <0.015 UG/L (ref 0–0.04)
WBC # BLD AUTO: 8.5 10E9/L (ref 4–11)

## 2018-03-06 PROCEDURE — 80048 BASIC METABOLIC PNL TOTAL CA: CPT | Performed by: EMERGENCY MEDICINE

## 2018-03-06 PROCEDURE — 84484 ASSAY OF TROPONIN QUANT: CPT | Performed by: EMERGENCY MEDICINE

## 2018-03-06 PROCEDURE — 25000125 ZZHC RX 250: Performed by: EMERGENCY MEDICINE

## 2018-03-06 PROCEDURE — 85025 COMPLETE CBC W/AUTO DIFF WBC: CPT | Performed by: EMERGENCY MEDICINE

## 2018-03-06 PROCEDURE — 71046 X-RAY EXAM CHEST 2 VIEWS: CPT

## 2018-03-06 PROCEDURE — 93005 ELECTROCARDIOGRAM TRACING: CPT

## 2018-03-06 PROCEDURE — 99285 EMERGENCY DEPT VISIT HI MDM: CPT | Mod: 25

## 2018-03-06 PROCEDURE — 25000132 ZZH RX MED GY IP 250 OP 250 PS 637: Performed by: EMERGENCY MEDICINE

## 2018-03-06 PROCEDURE — 85379 FIBRIN DEGRADATION QUANT: CPT | Performed by: EMERGENCY MEDICINE

## 2018-03-06 RX ADMIN — LIDOCAINE HYDROCHLORIDE 30 ML: 20 SOLUTION ORAL; TOPICAL at 12:48

## 2018-03-06 ASSESSMENT — ENCOUNTER SYMPTOMS
SHORTNESS OF BREATH: 1
NAUSEA: 1

## 2018-03-06 NOTE — ED AVS SNAPSHOT
[facility] Emergency Department    [address] [place] [address]    Phone:  [phone]    Fax:  [phone]                                       Kenna Carballo   MRN: [id number]    Department:  [facility] Emergency Department   Date of Visit:  3/6/2018           Patient Information     Date Of Birth          1985        Your diagnoses for this visit were:     Acute chest pain        You were seen by Lucero Ca MD.      Follow-up Information     Follow up with Trang Alvares MD In 2 days.    Specialty:  OB/Gyn    Contact information:    [facility]  [address]  [facility] [address] [phone]          Discharge Instructions       Discharge Instructions  Chest Pain    You have been seen today for chest pain or discomfort.  At this time, your provider has found no signs that your chest pain is due to a serious or life-threatening condition, (or you have declined more testing and/or admission to the hospital). However, sometimes there is a serious problem that does not show up right away. Your evaluation today may not be complete and you may need further testing and evaluation.     Generally, every Emergency Department visit should have a follow-up clinic visit with either a primary or a specialty clinic/provider. Please follow-up as instructed by your emergency provider today.  Return to the Emergency Department if:    Your chest pain changes, gets worse, starts to happen more often, or comes with less activity.    You are newly short of breath.    You get very weak or tired.    You pass out or faint.    You have any new symptoms, like fever, cough, numb legs, or you cough up blood.    You have anything else that worries you.    Until you follow-up with your regular provider, please do the following:    Take one aspirin daily unless you have an allergy or are told not to by your provider.    If a stress test appointment has  been made, go to the appointment.    If you have questions, contact your regular provider.    Follow-up with your regular provider/clinic as directed; this is very important.    If you were given a prescription for medicine here today, be sure to read all of the information (including the package insert) that comes with your prescription.  This will include important information about the medicine, its side effects, and any warnings that you need to know about.  The pharmacist who fills the prescription can provide more information and answer questions you may have about the medicine.  If you have questions or concerns that the pharmacist cannot address, please call or return to the Emergency Department.       Remember that you can always come back to the Emergency Department if you are not able to see your regular provider in the amount of time listed above, if you get any new symptoms, or if there is anything that worries you.      24 Hour Appointment Hotline       To make an appointment at any [facility], call [phone] ([phone]). If you don't have a family doctor or clinic, we will help you find one. [facility] are conveniently located to serve the needs of you and your family.          ED Discharge Orders     Exercise Stress Echocardiogram       Administration of IV contrast will be tailored to this examination per the appropriate written protocol listed in the Echocardiography department Protocol Book, or by the supervising Cardiologist. This may result in an order change.    Use of contrast is at the discretion of the supervising Cardiologist.                     Review of your medicines      Our records show that you are taking the medicines listed below. If these are incorrect, please call your family doctor or clinic.        Dose / Directions Last dose taken    calcium carbonate 1250 MG tablet   Commonly known as:  OS-TIAGO 500 mg [place]. Ca   Dose:  500 mg        Take 500 mg by mouth 2  times daily   Refills:  0        fluocinonide 0.05 % cream   Commonly known as:  LIDEX        APPLY TO ECZEMA ON BODY BID PRN   Refills:  1        LORazepam 1 MG tablet   Commonly known as:  ATIVAN        TK 1 T PO 30 MINUTES PRIOR TO VISIT   Refills:  0        MICROGESTIN FE 1/20 1-20 MG-MCG per tablet   Generic drug:  norethindrone-ethinyl estradiol        TK 1 T PO QD   Refills:  1        OMEGA-3 FISH OIL PO   Dose:  1 g        Take 1 g by mouth daily   Refills:  0        triamcinolone 0.1 % cream   Commonly known as:  KENALOG   Quantity:  80 g        Apply sparingly to affected area three times daily for 14 days.   Refills:  11        UNABLE TO FIND        MEDICATION NAME: Unknown -- Birth Control Pill   Refills:  0                Procedures and tests performed during your visit     Basic metabolic panel    CBC with platelets differential    D dimer quantitative    EKG 12 lead    Troponin I    Troponin I (now)    XR Chest 2 Views      Orders Needing Specimen Collection     None      Pending Results     No orders found from 3/4/2018 to 3/7/2018.            Pending Culture Results     No orders found from 3/4/2018 to 3/7/2018.            Pending Results Instructions     If you had any lab results that were not finalized at the time of your Discharge, you can call the ED Lab Result RN at [phone]. You will be contacted by this team for any positive Lab results or changes in treatment. The nurses are available 7 days a week from 10A to 6:30P.  You can leave a message 24 hours per day and they will return your call.        Test Results From Your Hospital Stay        3/6/2018 12:03 PM      Component Results     Component Value Ref Range & Units Status    WBC 8.5 4.0 - 11.0 10e9/L Final    RBC Count 4.18 3.8 - 5.2 10e12/L Final    Hemoglobin 13.2 11.7 - 15.7 g/dL Final    Hematocrit 40.2 35.0 - 47.0 % Final    MCV 96 78 - 100 fl Final    MCH 31.6 26.5 - 33.0 pg Final    MCHC 32.8 31.5 - 36.5 g/dL Final    RDW 12.9  10.0 - 15.0 % Final    Platelet Count 274 150 - 450 10e9/L Final    Diff Method Automated Method  Final    % Neutrophils 61.9 % Final    % Lymphocytes 27.4 % Final    % Monocytes 6.7 % Final    % Eosinophils 2.5 % Final    % Basophils 1.1 % Final    % Immature Granulocytes 0.4 % Final    Nucleated RBCs 0 0 /100 Final    Absolute Neutrophil 5.3 1.6 - 8.3 10e9/L Final    Absolute Lymphocytes 2.3 0.8 - 5.3 10e9/L Final    Absolute Monocytes 0.6 0.0 - 1.3 10e9/L Final    Absolute Eosinophils 0.2 0.0 - 0.7 10e9/L Final    Absolute Basophils 0.1 0.0 - 0.2 10e9/L Final    Abs Immature Granulocytes 0.0 0 - 0.4 10e9/L Final    Absolute Nucleated RBC 0.0  Final         3/6/2018 12:24 PM      Component Results     Component Value Ref Range & Units Status    Sodium 134 133 - 144 mmol/L Final    Potassium 3.9 3.4 - 5.3 mmol/L Final    Chloride 104 94 - 109 mmol/L Final    Carbon Dioxide 21 20 - 32 mmol/L Final    Anion Gap 9 3 - 14 mmol/L Final    Glucose 90 70 - 99 mg/dL Final    Urea Nitrogen 16 7 - 30 mg/dL Final    Creatinine 0.52 0.52 - 1.04 mg/dL Final    GFR Estimate >90 >60 mL/min/1.7m2 Final    Non  GFR Calc    GFR Estimate If Black >90 >60 mL/min/1.7m2 Final    African American GFR Calc    Calcium 8.5 8.5 - 10.1 mg/dL Final         3/6/2018 12:24 PM      Component Results     Component Value Ref Range & Units Status    Troponin I ES <0.015 0.000 - 0.045 ug/L Final    The 99th percentile for upper reference range is 0.045 ug/L.  Troponin values   in the range of 0.045 - 0.120 ug/L may be associated with risks of adverse   clinical events.           3/6/2018 12:18 PM      Component Results     Component Value Ref Range & Units Status    D Dimer <0.3 0.0 - 0.50 ug/ml FEU Final    This D-dimer assay is intended for use in conjunction with a clinical pretest   probability assessment model to exclude pulmonary embolism (PE) and deep   venous thrombosis (DVT) in outpatients suspected of PE or DVT. The cut-off    value is 0.5 ug/mL FEU.           3/6/2018  1:00 PM      Narrative     XR CHEST 2 VW 3/6/2018 12:58 PM     HISTORY: Chest pain.         Impression     IMPRESSION: PA and lateral views of the chest. Lungs are clear. Heart  is normal in size. No effusions are evident. No pneumothorax.    ABDON EDWARDS MD         3/6/2018  2:55 PM      Component Results     Component Value Ref Range & Units Status    Troponin I ES <0.015 0.000 - 0.045 ug/L Final    The 99th percentile for upper reference range is 0.045 ug/L.  Troponin values   in the range of 0.045 - 0.120 ug/L may be associated with risks of adverse   clinical events.                  Clinical Quality Measure: Blood Pressure Screening     Your blood pressure was checked while you were in the emergency department today. The last reading we obtained was  BP: 131/81 . Please read the guidelines below about what these numbers mean and what you should do about them.  If your systolic blood pressure (the top number) is less than 120 and your diastolic blood pressure (the bottom number) is less than 80, then your blood pressure is normal. There is nothing more that you need to do about it.  If your systolic blood pressure (the top number) is 120-139 or your diastolic blood pressure (the bottom number) is 80-89, your blood pressure may be higher than it should be. You should have your blood pressure rechecked within a year by a primary care provider.  If your systolic blood pressure (the top number) is 140 or greater or your diastolic blood pressure (the bottom number) is 90 or greater, you may have high blood pressure. High blood pressure is treatable, but if left untreated over time it can put you at risk for heart attack, stroke, or kidney failure. You should have your blood pressure rechecked by a primary care provider within the next 4 weeks.  If your provider in the emergency department today gave you specific instructions to follow-up with your doctor or provider even  sooner than that, you should follow that instruction and not wait for up to 4 weeks for your follow-up visit.        Thank you for choosing [facility]       Thank you for choosing [facility] for your care. Our goal is always to provide you with excellent care. Hearing back from our patients is one way we can continue to improve our services. Please take a few minutes to complete the written survey that you may receive in the mail after you visit with us. Thank you!        [employer] Information     [facility] gives you secure access to your electronic health record. If you see a primary care provider, you can also send messages to your care team and make appointments. If you have questions, please call your primary care clinic.  If you do not have a primary care provider, please call [phone] and they will assist you.        Care EveryWhere ID     This is your Care EveryWhere ID. This could be used by other organizations to access your [facility] medical records  [id number]        Equal Access to Services     RADHA HERNANDEZ : Diann Corcoran, demarcus antony, katja cortes, bert martinez . So [facility] [phone].    ATENCIÓN: Si habla español, tiene a walsh disposición servicios gratuitos de asistencia lingüística. Nahid al [phone].    We comply with applicable federal civil rights laws and [place] laws. We do not discriminate on the basis of race, color, national origin, age, disability, sex, sexual orientation, or gender identity.            After Visit Summary       This is your record. Keep this with you and show to your community pharmacist(s) and doctor(s) at your next visit.

## 2018-03-06 NOTE — ED PROVIDER NOTES
History     Chief Complaint:  Chest Pain      HPI   Kenna Carballo is a 32 year old female with a history of SVT status post ablation who presents with chest pain. The patient states that she was up at 0545 this morning and subsequently developed sternal chest pressure. She states that the pain has been constant, but has improved over the last 30 minutes. With the pain she did have slight nausea as well as shortness of breath. She states that she has never had symptoms like this in the past. The patient did not take anything for symptoms.  She has not had any leg swelling.       Cardiac Risk Factors   Sex: Female   Tobacco: Negative   Hypertension: Negative  Diabetes: Negative  Hyperlipidemia: Negative  Family History: Positive    PE/DVT Risk Factors   Personal History: Negative  Recent Travel: Positive  Recent Surgery/Hospitalization: Negative  Tobacco: Negative  Family History: Negative  Cancer: Negative  Trauma: Negative       Allergies:  No known drug allergies.      Medications:    Ativan   Lidex  Microgestin   Kenalog  Calcium Carbonate   Omega-3 fatty acids     Past Medical History:    SVT    Past Surgical History:    Ablation for SVT   section     Family History:    Heart Disease-Father  Breast Cancer-Mother    Social History:  Marital Status:   Presents to the ED alone  Tobacco Use: Never Used  Alcohol Use: Yes  PCP: Trang Alvares      Review of Systems   Respiratory: Positive for shortness of breath.    Cardiovascular: Positive for chest pain. Negative for leg swelling.   Gastrointestinal: Positive for nausea.   All other systems reviewed and are negative.    Physical Exam   First Vitals:  BP: 137/87  Heart Rate: 94  Temp: 97.6  F (36.4  C)  Resp: 16  SpO2: 99 %      Physical Exam  General: Patient is alert and interactive when I enter the room  Head:  The scalp, face, and head appear normal  Eyes:  Conjunctivae are normal  ENT:    The nose is normal    Pinnae are  normal    External acoustic canals are normal  Neck:  Trachea midline  CV:  Pulses are normal, RRR.    Resp:  No respiratory distress, CTAB   Abdomen:      Soft, non-tender, non-distended  Musc:  Normal muscular tone    No major joint effusions    No asymmetric leg swelling  Skin:  No rash or lesions noted  Neuro:  Speech is normal and fluent. Face is symmetric.     Moving all extremities well.   Psych: Awake. Alert.  Normal affect.  Appropriate interactions.      Emergency Department Course   ECG:  @ 1048  Indication: Chest pain  Vent. Rate 80 bpm. VT interval 138 ms. QRS duration 86 ms. QT/QTc 376/433 ms. P-R-T axis 49 35 20.   Sinus rhythm with sinus arrhythmia with occasional premature ventricular complexes. Otherwise normal ECG.    Read @ 1134 by Dr. Ca.     Imaging:  Chest x-ray, 2 views:   PA and lateral views of the chest. Lungs are clear. Heart  is normal in size. No effusions are evident. No pneumothorax.  Report per radiology.   Radiographic findings were communicated with the patient who voiced understanding of the findings.    Laboratory:  D dimer: <0.3  (1108) Troponin I: <0.015  BMP:  WNL (Creatinine 0.52)   CBC:  WBC 8.5, HGB 13.2, , otherwise WNL   (1413) Troponin: <0.015    Interventions:   GI Cocktail (Maalox/Mylanta and viscous Lidocaine), 30 mL suspension, PO      Emergency Department Course:  Nursing notes and vitals reviewed.  (0169) I performed an exam of the patient as documented above.    EKG was done, interpretation as above.   A peripheral IV was established. Blood was drawn from the patient. This was sent for laboratory testing, findings above.    The patient was sent for a chest x-ray while in the emergency department, findings above.    Findings and plan explained to the patient. Patient discharged home with instructions regarding supportive care, medications, and reasons to return. The importance of close follow-up was reviewed.   I personally reviewed the laboratory  results with the patient and answered all related questions prior to discharge.      Impression & Plan      Medical Decision Making:  Kenna Carballo is a 32 year old female who presents with chest pain.  The work up in the Emergency Department is negative.  I considered a broad differential diagnosis in this patient including life-threatening etiologies such as acute coronary syndrome, myocardial infarction, pulmonary embolism, acute aortic dissection, myocarditis, pericarditis, acute valvular insufficiency amongst others.  Other causes considered for this patient included pneumonia, pneumothorax, chest wall source, pericarditis, pleurisy, esophageal spasm, etc.  No serious etiology for the chest pain were detected today during this visit.  Given her family risk, I did obtain a delta troponin. This was negative. Heart score is low so a candidate for outpatient management. D-dimer negative so given her low risk feel this is enough for rule out. Close follow up with primary care is indicated should the pain continue, as further work up may be performed; this was made clear to the patient, who understands.  She will have a stress done as an outpatient.     Diagnosis:    ICD-10-CM    1. Acute chest pain R07.9 Exercise Stress Echocardiogram       Disposition:  discharged to home      I, Ella Gallardo, am serving as a scribe on 3/6/2018 at 11:27 AM to personally document services performed by Dr. Ca based on my observations and the provider's statements to me.    3/6/2018   [facility] EMERGENCY DEPARTMENT       Lucero Ca MD  03/07/18 1847

## 2018-03-06 NOTE — ED AVS SNAPSHOT
[facility] Emergency Department    [address]    [facility] [address]    Phone:  [phone]    Fax:  [phone]                                       Kenna Carballo   MRN: [id number]    Department:  [facility] Emergency Department   Date of Visit:  3/6/2018           After Visit Summary Signature Page     I have received my discharge instructions, and my questions have been answered. I have discussed any challenges I see with this plan with the nurse or doctor.    ..........................................................................................................................................  Patient/Patient Representative Signature      ..........................................................................................................................................  Patient Representative Print Name and Relationship to Patient    ..................................................               ................................................  Date                                            Time    ..........................................................................................................................................  Reviewed by Signature/Title    ...................................................              ..............................................  Date                                                            Time

## 2018-03-06 NOTE — ED NOTES
Patient present with mid-sternal chest pain that started when she woke up this morning. Patient states some shortness of breath and nausea, but no vomiting. Patient describes as pressure. ABCDs intact, alert and oriented x 4.

## 2018-03-31 ENCOUNTER — HEALTH MAINTENANCE LETTER (OUTPATIENT)
Age: 33
End: 2018-03-31

## 2018-05-14 ENCOUNTER — APPOINTMENT (OUTPATIENT)
Dept: LAB | Facility: CLINIC | Age: 33
End: 2018-05-14
Payer: COMMERCIAL

## 2018-05-14 ENCOUNTER — OFFICE VISIT (OUTPATIENT)
Dept: ONCOLOGY | Facility: CLINIC | Age: 33
End: 2018-05-14
Attending: OBSTETRICS & GYNECOLOGY
Payer: COMMERCIAL

## 2018-05-14 VITALS
OXYGEN SATURATION: 95 % | WEIGHT: 191 LBS | BODY MASS INDEX: 31.82 KG/M2 | SYSTOLIC BLOOD PRESSURE: 113 MMHG | HEART RATE: 84 BPM | RESPIRATION RATE: 16 BRPM | HEIGHT: 65 IN | DIASTOLIC BLOOD PRESSURE: 73 MMHG | TEMPERATURE: 98 F

## 2018-05-14 DIAGNOSIS — Z98.890 HISTORY OF COLPOSCOPY: Primary | ICD-10-CM

## 2018-05-14 DIAGNOSIS — C53.9 ADENOCARCINOMA OF CERVIX (H): ICD-10-CM

## 2018-05-14 LAB — HCG UR QL: NEGATIVE

## 2018-05-14 PROCEDURE — 87624 HPV HI-RISK TYP POOLED RSLT: CPT | Performed by: OBSTETRICS & GYNECOLOGY

## 2018-05-14 PROCEDURE — 57456 ENDOCERV CURETTAGE W/SCOPE: CPT | Mod: ZF | Performed by: OBSTETRICS & GYNECOLOGY

## 2018-05-14 PROCEDURE — G0463 HOSPITAL OUTPT CLINIC VISIT: HCPCS | Mod: ZF

## 2018-05-14 PROCEDURE — 88305 TISSUE EXAM BY PATHOLOGIST: CPT | Performed by: OBSTETRICS & GYNECOLOGY

## 2018-05-14 PROCEDURE — 81025 URINE PREGNANCY TEST: CPT | Performed by: OBSTETRICS & GYNECOLOGY

## 2018-05-14 PROCEDURE — 88175 CYTOPATH C/V AUTO FLUID REDO: CPT | Performed by: OBSTETRICS & GYNECOLOGY

## 2018-05-14 ASSESSMENT — PAIN SCALES - GENERAL: PAINLEVEL: NO PAIN (0)

## 2018-05-14 NOTE — LETTER
2018       RE: Kenna Carballo  [address]  [facility] [address]     Dear Colleague,    Thank you for referring your patient, Kenna Carballo, to the [facility]. Please see a copy of my visit note below.                          Follow Up Notes on Referred Patient       MD TOBIAS [facility]  [address] [facility] [address]  [place] [address]       RE: Kenna Carballo  : 1985  CHERRY: 2018       Dear Dr. Jerri Davison:    I had the pleasure of seeing your patient Kenna Carballo here at the Gynecologic Cancer Clinic at the [facility] on 2018.  As you know she is a very pleasant 32 year old woman with a recent diagnosis of cervical dysplasia and AIS.  Given these findings she was subsequently sent to the Gynecologic Cancer Clinic for patient care and follows up. 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 On 10/23/17, she had negative HPV with ASCUS Pap and negative ECC. Most recently in 2018, ECC was negative  She now presents for 3 month recheck colposcopy and ECC. She is interested in fertility and if the results from today are reassuring, planning on attempting pregnancy. She reports no change in symptoms since last visit. She denies lower extremity or vulvar swelling, abnormal bleeding, dyspareunia, enlarged lymph nodes.       LEEP 2017:   FINAL DIAGNOSIS:   Cervix, LEEP:   - Ectocervix, transformation zone and endocervix present.   - High grade squamous intraepithelial lesion (VAISHNAVI 2 and VAISHNAVI 3/moderate and severe dysplasia) with extension into endocervical glands and involving all four quadrants.   - Endocervical adenocarcinoma in situ, small focus.   - Endocervical margin positive for high grade squamous intraepithelial lesion.   - Ectocervical margin close to dysplastic epithelium.       10/2017:  FINAL DIAGNOSIS:   ENDOCERVICAL CURETTINGS:   - Endocervical mucosa with squamous metaplasia,  "inflammatory and   reactive changes   - Negative for dysplasia or malignancy   PAP:  - ASCUS, HPV negative      2018: ECC  FINAL DIAGNOSIS:   ENDOCERVIX; CURETTAGE:   - Fragments of endocervical mucosa with squamous metaplasia   - Negative for dysplasia or malignancy         Past Medical History:   Diagnosis Date     SVT (supraventricular tachycardia) (H)     s/p ablation       Past Surgical History:    Past Surgical History:   Procedure Laterality Date     CARDIAC SURGERY      ablation for svt      SECTION N/A 2015    Procedure:  SECTION;  Surgeon: Coco Cross MD;  Location: [facility]         Health Maintenance:  Health Maintenance Due   Topic Date Due     MAMMO Q1 YR  2018       Last Pap Smear:  Result: abnormal: see above  She has had a history of abnormal Pap smears.    Current Medications:     has a current medication list which includes the following prescription(s): fluocinonide, lorazepam, microgestin fe , omega-3 fatty acids, calcium carbonate, triamcinolone, and UNABLE TO FIND.       Allergies:        Allergies   Allergen Reactions     Bee Pollen Other (See Comments)     PN: Swelling at sting site     Latex             Social History:     Social History   Substance Use Topics     Smoking status: Never Smoker     Smokeless tobacco: Never Used     Alcohol use 0.0 oz/week     0 Standard drinks or equivalent per week      Comment: 2-4 per wk       History   Drug Use No           Family History:     The patient's family history is notable for .    Family History   Problem Relation Age of Onset     Breast Cancer Mother    anlso AUNT (65), paternal GM 45,     Her mother (Annmarie Ochoa) genetic panel testing is reviewed and is notable for a VUS in MSH2    Physical Exam:     PS: 0  /73  Pulse 84  Temp 98  F (36.7  C) (Tympanic)  Resp 16  Ht 1.651 m (5' 5\")  Wt 86.6 kg (191 lb)  LMP 2018  SpO2 95%  Breastfeeding? No  BMI 31.78 kg/m2    General " Appearance: healthy and alert, no distress  Musculoskeletal: extremities non tender and without edema  Neurological: normal gait, no gross defects  Psychiatric: appropriate mood and affect           Hematological:normal cervical, supraclavicular and inguinal lymph nodes  Gastrointestinal:abdomen soft, non-tender, non-distended, no organomegaly or masses  Genitourinary: External genitalia and urethral meatus appears normal.  Vagina is smooth without nodularity or masses.  Cervix appears grossly normal without significant event.      Colposcopy  With the patient in dorsal lithotomy position the cervix and upper vagina were inspected and were grossly normal. Acetic acid and subsequently Lugol's were applied and there were no new areas of acetowhite changes or decreased uptake respectively. Satisfactory colposcopy. An ECC was performed, but no other biopsies were performed.   Impression: Negative without cancer or dysplasia.      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     A total of 30 minutes was spent with the patient, 20 minutes of which were spent in counseling the patient and/or treatment planning.      Plan:     1.)    Cervical dysplasia/AIS:   - Patient continues to desire observation in this setting as she had most recent ECC negative and desires fertility. The patient understands that in the absence of hysterectomy complete excision mass cannot be definitively proven. A colposcopy and ECC is recommended on a Q3-6 month basis until she becomes pregnant. If results normal from today, patient is planning to attempt pregnancy. She reports that she desires only one future pregnancy and would like to proceed with definitive surgical management with hysterectomy as is appropriate once postpartum.       2.) Genetic risk factors were assessed and the patient's mother was tested for BRCA given her  early-onset multifocal breast cancer. The patient believes that she was tested with a BRCA 1 and 2 analysis and not a BRCA panel screen. I therefore recommended she consider retesting or having her mother retested for a panel of BRCA related mutations.    3.) Labs and/or tests ordered include:  none.     4.) Health maintenance issues addressed today include none.     Thank you for allowing us to participate in the care of your patient.       I have examined this patient with our resident who acted as as scribe and agree with the above findings and plan.    Jerzy Brewer MD  OBGYN PGY-3  10:09 AM 5/14/2018      CC  Patient Care Team:  Trang Alvares MD as PCP - General (OB/Gyn)  Jyoti Vasquez RN as Registered Nurse  FRANKO RAMIREZ

## 2018-05-14 NOTE — MR AVS SNAPSHOT
"              After Visit Summary   5/14/2018    Kenna Carballo    MRN: [id number]           Patient Information     Date Of Birth          1985        Visit Information        Provider Department      5/14/2018 9:00 AM Jerzy Harkins MD [facility]        Today's Diagnoses     History of colposcopy    -  1    Adenocarcinoma of cervix (H)           Follow-ups after your visit        Who to contact     If you have questions or need follow up information about today's clinic visit or your schedule please contact [facility] directly at [phone].  Normal or non-critical lab and imaging results will be communicated to you by Serstechhart, letter or phone within 4 business days after the clinic has received the results. If you do not hear from us within 7 days, please contact the clinic through RiverWired[employer] or phone. If you have a critical or abnormal lab result, we will notify you by phone as soon as possible.  Submit refill requests through [employer] or call your pharmacy and they will forward the refill request to us. Please allow 3 business days for your refill to be completed.          Additional Information About Your Visit        MyChart Information     [employer] gives you secure access to your electronic health record. If you see a primary care provider, you can also send messages to your care team and make appointments. If you have questions, please call your primary care clinic.  If you do not have a primary care provider, please call [phone] and they will assist you.        Care EveryWhere ID     This is your Care EveryWhere ID. This could be used by other organizations to access your [facility] medical records  [id number]        Your Vitals Were     Pulse Temperature Respirations Height Last Period Pulse Oximetry    84 98  F (36.7  C) (Tympanic) 16 1.651 m (5' 5\") 05/13/2018 95%    Breastfeeding? BMI (Body Mass Index)                No 31.78 kg/m2           " Blood Pressure from Last 3 Encounters:   05/14/18 113/73   03/06/18 131/81   02/26/18 138/85    Weight from Last 3 Encounters:   05/14/18 86.6 kg (191 lb)   02/26/18 85.3 kg (188 lb)   10/23/17 86.5 kg (190 lb 11.2 oz)              We Performed the Following     Colposcopy cervix with ECC     HCG qualitative urine     HPV High Risk Types DNA Cervical     Pap imaged thin layer diagnostic with HPV (select HPV order below)     Surgical Pathology Exam        Primary Care Provider Office Phone # Fax #    Trang Hang Alvares [phone]       [facility] [address]  [place] [address]        Equal Access to Services     RADHA HERNANDEZ : Hadii nabila muñozo Socorrine, waaxda luqadaha, qaybta kaalmada adeegyada, bert martinez . So [facility] [phone].    ATENCIÓN: Si habla español, tiene a walsh disposición servicios gratuitos de asistencia lingüística. [facility] [phone].    We comply with applicable federal civil rights laws and [place] laws. We do not discriminate on the basis of race, color, national origin, age, disability, sex, sexual orientation, or gender identity.            Thank you!     Thank you for choosing [facility]  for your care. Our goal is always to provide you with excellent care. Hearing back from our patients is one way we can continue to improve our services. Please take a few minutes to complete the written survey that you may receive in the mail after your visit with us. Thank you!             Your Updated Medication List - Protect others around you: Learn how to safely use, store and throw away your medicines at www.disposemymeds.org.          This list is accurate as of 5/14/18 10:33 AM.  Always use your most recent med list.                   Brand Name Dispense Instructions for use Diagnosis    calcium carbonate 500 MG tablet    OS-TIAGO 500 mg [place]. Ca     Take 500 mg by mouth 2 times daily        fluocinonide  0.05 % cream    LIDEX     APPLY TO ECZEMA ON BODY BID PRN        LORazepam 1 MG tablet    ATIVAN     TK 1 T PO 30 MINUTES PRIOR TO VISIT        MICROGESTIN FE 1/20 1-20 MG-MCG per tablet   Generic drug:  norethindrone-ethinyl estradiol      TK 1 T PO QD        OMEGA-3 FISH OIL PO      Take 1 g by mouth daily        triamcinolone 0.1 % cream    KENALOG    80 g    Apply sparingly to affected area three times daily for 14 days.    Eczema, unspecified eczema       UNABLE TO FIND      MEDICATION NAME: Unknown -- Birth Control Pill

## 2018-05-14 NOTE — PROGRESS NOTES
Follow Up Notes on Referred Patient       Dr. Jerri Davison MD  [facility] OBGYN CONSULT  [address] [address]       RE: Kenna Carballo  : 1985  CHERRY: 2018       Dear Dr. Jerri Davison:    I had the pleasure of seeing your patient Kenna Carballo here at the Gynecologic Cancer Clinic at the [facility] on 2018.  As you know she is a very pleasant 32 year old woman with a recent diagnosis of cervical dysplasia and AIS.  Given these findings she was subsequently sent to the Gynecologic Cancer Clinic for patient care and follows up. 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 On 10/23/17, she had negative HPV with ASCUS Pap and negative ECC. Most recently in 2018, ECC was negative  She now presents for 3 month recheck colposcopy and ECC. She is interested in fertility and if the results from today are reassuring, planning on attempting pregnancy. She reports no change in symptoms since last visit. She denies lower extremity or vulvar swelling, abnormal bleeding, dyspareunia, enlarged lymph nodes.       LEEP 2017:   FINAL DIAGNOSIS:   Cervix, LEEP:   - Ectocervix, transformation zone and endocervix present.   - High grade squamous intraepithelial lesion (VAISHNAVI 2 and VAISHNAVI 3/moderate and severe dysplasia) with extension into endocervical glands and involving all four quadrants.   - Endocervical adenocarcinoma in situ, small focus.   - Endocervical margin positive for high grade squamous intraepithelial lesion.   - Ectocervical margin close to dysplastic epithelium.       10/2017:  FINAL DIAGNOSIS:   ENDOCERVICAL CURETTINGS:   - Endocervical mucosa with squamous metaplasia, inflammatory and   reactive changes   - Negative for dysplasia or malignancy   PAP:  - ASCUS, HPV negative      2018: ECC  FINAL DIAGNOSIS:   ENDOCERVIX; CURETTAGE:   - Fragments of endocervical mucosa with squamous metaplasia   - Negative for  "dysplasia or malignancy         Past Medical History:   Diagnosis Date     SVT (supraventricular tachycardia) (H)     s/p ablation       Past Surgical History:    Past Surgical History:   Procedure Laterality Date     CARDIAC SURGERY      ablation for svt      SECTION N/A 2015    Procedure:  SECTION;  Surgeon: Coco Cross MD;  [facility]: [facility]         Health Maintenance:  Health Maintenance Due   Topic Date Due     MAMMO Q1 YR  2018       Last Pap Smear:  Result: abnormal: see above  She has had a history of abnormal Pap smears.    Current Medications:     has a current medication list which includes the following prescription(s): fluocinonide, lorazepam, microgestin fe , omega-3 fatty acids, calcium carbonate, triamcinolone, and UNABLE TO FIND.       Allergies:        Allergies   Allergen Reactions     Bee Pollen Other (See Comments)     PN: Swelling at sting site     Latex             Social History:     Social History   Substance Use Topics     Smoking status: Never Smoker     Smokeless tobacco: Never Used     Alcohol use 0.0 oz/week     0 Standard drinks or equivalent per week      Comment: 2-4 per wk       History   Drug Use No           Family History:     The patient's family history is notable for .    Family History   Problem Relation Age of Onset     Breast Cancer Mother    anlso AUNT (65), paternal GM 45,     Her mother (Annmarie Ochoa) genetic panel testing is reviewed and is notable for a VUS in MSH2    Physical Exam:     PS: 0  /73  Pulse 84  Temp 98  F (36.7  C) (Tympanic)  Resp 16  Ht 1.651 m (5' 5\")  Wt 86.6 kg (191 lb)  LMP 2018  SpO2 95%  Breastfeeding? No  BMI 31.78 kg/m2    General Appearance: healthy and alert, no distress  Musculoskeletal: extremities non tender and without edema  Neurological: normal gait, no gross defects  Psychiatric: appropriate mood and affect           Hematological:normal cervical, supraclavicular and " inguinal lymph nodes  Gastrointestinal:abdomen soft, non-tender, non-distended, no organomegaly or masses  Genitourinary: External genitalia and urethral meatus appears normal.  Vagina is smooth without nodularity or masses.  Cervix appears grossly normal without significant event.      Colposcopy  With the patient in dorsal lithotomy position the cervix and upper vagina were inspected and were grossly normal. Acetic acid and subsequently Lugol's were applied and there were no new areas of acetowhite changes or decreased uptake respectively. Satisfactory colposcopy. An ECC was performed, but no other biopsies were performed.   Impression: Negative without cancer or dysplasia.      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     A total of 30 minutes was spent with the patient, 20 minutes of which were spent in counseling the patient and/or treatment planning.      Plan:     1.)    Cervical dysplasia/AIS:   - Patient continues to desire observation in this setting as she had most recent ECC negative and desires fertility. The patient understands that in the absence of hysterectomy complete excision mass cannot be definitively proven. A colposcopy and ECC is recommended on a Q3-6 month basis until she becomes pregnant. If results normal from today, patient is planning to attempt pregnancy. She reports that she desires only one future pregnancy and would like to proceed with definitive surgical management with hysterectomy as is appropriate once postpartum.       2.) Genetic risk factors were assessed and the patient's mother was tested for BRCA given her early-onset multifocal breast cancer. The patient believes that she was tested with a BRCA 1 and 2 analysis and not a BRCA panel screen. I therefore recommended she consider retesting or having her mother retested for a panel of BRCA related  mutations.    3.) Labs and/or tests ordered include:  none.     4.) Health maintenance issues addressed today include none.     Thank you for allowing us to participate in the care of your patient.       I have examined this patient with our resident who acted as as scribe and agree with the above findings and plan.    Jerzy Brewer MD  OBGYN PGY-3  10:09 AM 5/14/2018          CC  Patient Care Team:  Trang Alvares MD as PCP - General (OB/Gyn)  Jerzy Harkins MD as PCP - Obstetrics/Gynecology (Oncology)  Christina, Jyoti MENDIETA RN as Registered Nurse  FRANKO RAMIREZ        Answers for HPI/ROS submitted by the patient on 2/16/2018   General Symptoms: No  Skin Symptoms: No  HENT Symptoms: No  EYE SYMPTOMS: No  HEART SYMPTOMS: No  LUNG SYMPTOMS: No  INTESTINAL SYMPTOMS: No  URINARY SYMPTOMS: No  GYNECOLOGIC SYMPTOMS: No  BREAST SYMPTOMS: No  SKELETAL SYMPTOMS: No  BLOOD SYMPTOMS: No  NERVOUS SYSTEM SYMPTOMS: No  MENTAL HEALTH SYMPTOMS: No

## 2018-05-14 NOTE — NURSING NOTE
"Oncology Rooming Note    May 14, 2018 9:31 AM   Kenna Carballo is a 32 year old female who presents for:    Chief Complaint   Patient presents with     Oncology Clinic Visit     Colposcopy     Initial Vitals: /73  Pulse 84  Temp 98  F (36.7  C) (Tympanic)  Resp 16  Ht 1.651 m (5' 5\")  Wt 86.6 kg (191 lb)  LMP 05/13/2018  SpO2 95%  Breastfeeding? No  BMI 31.78 kg/m2 Estimated body mass index is 31.78 kg/(m^2) as calculated from the following:    Height as of this encounter: 1.651 m (5' 5\").    Weight as of this encounter: 86.6 kg (191 lb). Body surface area is 1.99 meters squared.  No Pain (0) Comment: Data Unavailable   Patient's last menstrual period was 05/13/2018.  Allergies reviewed: Yes  Medications reviewed: Yes    Medications: Medication refills not needed today.  Pharmacy name entered into [facility]: [facility] [address] [place] [address] [address] AT [facility] & [facility]    Clinical concerns: Colposcopy     6 minutes for nursing intake (face to face time)     Meg Yu CMA              "

## 2018-05-15 LAB — COPATH REPORT: NORMAL

## 2018-05-16 LAB
COPATH REPORT: NORMAL
PAP: NORMAL

## 2018-05-17 LAB
FINAL DIAGNOSIS: NORMAL
HPV HR 12 DNA CVX QL NAA+PROBE: NEGATIVE
HPV16 DNA SPEC QL NAA+PROBE: NEGATIVE
HPV18 DNA SPEC QL NAA+PROBE: NEGATIVE
SPECIMEN DESCRIPTION: NORMAL
SPECIMEN SOURCE CVX/VAG CYTO: NORMAL

## 2018-09-30 NOTE — PROGRESS NOTES
Follow Up Notes on Referred Patient       Dr. Jerri Davison MD  [facility] OBGYN CONSULT  [address] [address]       RE: Kenna Carballo  : 1985  CHERRY: 10/1/2018       Dear Dr. Jerri Davison:    I had the pleasure of seeing your patient Kenna Carballo here at the Gynecologic Cancer Clinic at the [facility].  As you know she is a very pleasant 32 year old woman with a recent diagnosis of cervical dysplasia and AIS.  Given these findings she was subsequently sent to the Gynecologic Cancer Clinic for patient care and follows up. 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 On 10/23/17, she had negative HPV with ASCUS Pap and negative ECC. Most recently in 2018, ECC was negative  She now presents for 3 month recheck colposcopy and ECC. She is interested in fertility and if the results from today are reassuring, planning on attempting pregnancy. She reports no change in symptoms since last visit. She denies lower extremity or vulvar swelling, abnormal bleeding, dyspareunia, enlarged lymph nodes.       LEEP 2017:   FINAL DIAGNOSIS:   Cervix, LEEP:   - Ectocervix, transformation zone and endocervix present.   - High grade squamous intraepithelial lesion (VAISHNAVI 2 and VAISHNAVI 3/moderate and severe dysplasia) with extension into endocervical glands and involving all four quadrants.   - Endocervical adenocarcinoma in situ, small focus.   - Endocervical margin positive for high grade squamous intraepithelial lesion.   - Ectocervical margin close to dysplastic epithelium.       10/2017:  FINAL DIAGNOSIS:   ENDOCERVICAL CURETTINGS:   - Endocervical mucosa with squamous metaplasia, inflammatory and   reactive changes   - Negative for dysplasia or malignancy   PAP:  - ASCUS, HPV negative      2018: ECC  FINAL DIAGNOSIS:   ENDOCERVIX; CURETTAGE:   - Fragments of endocervical mucosa with squamous metaplasia   - Negative for dysplasia or  malignancy     2018:  ECC  FINAL DIAGNOSIS:   ENDOCERVICAL CURETTINGS:   - Endocervical mucosal fragments with squamous metaplasia and reactive   change   - Negative for dysplasia or malignancy     Pap: CYTOLOGIC INTERPRETATION:     Negative for intraepithelial lesion or malignancy       Past Medical History:   Diagnosis Date     SVT (supraventricular tachycardia) (H)     s/p ablation       Past Surgical History:    Past Surgical History:   Procedure Laterality Date     CARDIAC SURGERY      ablation for svt      SECTION N/A 2015    Procedure:  SECTION;  Surgeon: Coco Cross MD;  Location: [facility]         Health Maintenance:  Health Maintenance Due   Topic Date Due     MAMMO Q1 YR  2018     PHQ-2 Q1 YR  2018     INFLUENZA VACCINE (1) 2018       Last Pap Smear:  Result: abnormal: see above  She has had a history of abnormal Pap smears.      Allergies:        Allergies   Allergen Reactions     Bee Pollen Other (See Comments)     PN: Swelling at sting site     Latex             Social History:     Social History   Substance Use Topics     Smoking status: Never Smoker     Smokeless tobacco: Never Used     Alcohol use 0.0 oz/week     0 Standard drinks or equivalent per week      Comment: 2-4 per wk       History   Drug Use No           Family History:     The patient's family history is notable for .    Family History   Problem Relation Age of Onset     Breast Cancer Mother    anlso AUNT (65), paternal GM 45,     Her mother (Annmarie Wests) genetic panel testing is reviewed and is notable for a VUS in MSH2    Physical Exam:     PS: 0  VS: /89  Pulse 97  Temp 98.3  F (36.8  C) (Oral)  Wt 88.6 kg (195 lb 4.8 oz)  LMP 09/10/2018  SpO2 97%  BMI 32.5 kg/m2    General Appearance: healthy and alert, no distress  Musculoskeletal: extremities non tender and without edema  Neurological: normal gait, no gross defects  Psychiatric: appropriate mood and affect            Hematological:normal cervical, supraclavicular and inguinal lymph nodes  Gastrointestinal:abdomen soft, non-tender, non-distended, no organomegaly or masses  Genitourinary: External genitalia and urethral meatus appears normal.  Vagina is smooth without nodularity or masses.  Cervix appears grossly normal without significant event.      Colposcopy  With the patient in dorsal lithotomy position the cervix and upper vagina were inspected and were grossly normal.  Lugol's was applied and there were no new areas of acetowhite changes or decreased uptake respectively.An ECC was performed, but no other biopsies were performed.     Impression: Negative without cancer or dysplasia.      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     A total of 30 minutes was spent with the patient, 20 minutes of which were spent in counseling the patient and/or treatment planning.      Plan:     1.)    Cervical dysplasia/AIS:  Patient continues to desire observation in this setting as she had most recent ECC negative and desires fertility. The patient understands that in the absence of hysterectomy complete excision mass cannot be definitively proven. A colposcopy and ECC is recommended on a Q3-6 month basis until she becomes pregnant, which she will start attempting when today's results are back If results normal from today, patient is planning to attempt pregnancy. She reports that she desires only one future pregnancy and would like to proceed with definitive surgical management with hysterectomy as is appropriate once postpartum.     2.) Genetic risk factors were assessed and the patient's mother was tested for BRCA given her early-onset multifocal breast cancer. Her mother has now received panel testing and has a VUS ( I do not have these results).  Kenna has been advised to undergo testing and is currently holding  off pending her insurance review, but plans to do this when she is approved.    3.) Labs and/or tests ordered include:  none.     4.) Health maintenance issues addressed today include none.       I have examined this patient with our resident who acted as as scribe and agree with the above findings and plan.    Jerzy Harkins                  Patient Care Team:  Trang Alvares MD as PCP - General (OB/Gyn)  Shahana, Jerzy Aponte MD as PCP - Obstetrics/Gynecology (Oncology)  Jyoti Vasquez RN as Registered Nurse  FRANKO RAMIREZ

## 2018-10-01 ENCOUNTER — ONCOLOGY VISIT (OUTPATIENT)
Dept: ONCOLOGY | Facility: CLINIC | Age: 33
End: 2018-10-01
Attending: OBSTETRICS & GYNECOLOGY
Payer: COMMERCIAL

## 2018-10-01 ENCOUNTER — APPOINTMENT (OUTPATIENT)
Dept: LAB | Facility: CLINIC | Age: 33
End: 2018-10-01
Attending: OBSTETRICS & GYNECOLOGY
Payer: COMMERCIAL

## 2018-10-01 VITALS
SYSTOLIC BLOOD PRESSURE: 139 MMHG | DIASTOLIC BLOOD PRESSURE: 89 MMHG | BODY MASS INDEX: 32.5 KG/M2 | TEMPERATURE: 98.3 F | WEIGHT: 195.3 LBS | OXYGEN SATURATION: 97 % | HEART RATE: 97 BPM

## 2018-10-01 DIAGNOSIS — D06.9 ADENOCARCINOMA IN SITU (AIS) OF UTERINE CERVIX: ICD-10-CM

## 2018-10-01 DIAGNOSIS — Z98.890 HISTORY OF COLPOSCOPY: Primary | ICD-10-CM

## 2018-10-01 LAB — HCG UR QL: NEGATIVE

## 2018-10-01 PROCEDURE — 57505 ENDOCERVICAL CURETTAGE: CPT | Mod: ZF | Performed by: OBSTETRICS & GYNECOLOGY

## 2018-10-01 PROCEDURE — 81025 URINE PREGNANCY TEST: CPT | Performed by: OBSTETRICS & GYNECOLOGY

## 2018-10-01 PROCEDURE — 57456 ENDOCERV CURETTAGE W/SCOPE: CPT | Mod: ZP | Performed by: OBSTETRICS & GYNECOLOGY

## 2018-10-01 PROCEDURE — 88305 TISSUE EXAM BY PATHOLOGIST: CPT | Performed by: OBSTETRICS & GYNECOLOGY

## 2018-10-01 PROCEDURE — 99213 OFFICE O/P EST LOW 20 MIN: CPT | Mod: 25 | Performed by: OBSTETRICS & GYNECOLOGY

## 2018-10-01 ASSESSMENT — PAIN SCALES - GENERAL: PAINLEVEL: NO PAIN (0)

## 2018-10-01 NOTE — LETTER
10/1/2018       RE: Kenna Carballo  [address]  [facility] [address]     Dear Colleague,    Thank you for referring your patient, Kenna Carballo, to the [facility]. Please see a copy of my visit note below.                          Follow Up Notes on Referred Patient       MD [facility] OBGYN CONSULT  [address] [facility] [address]  [place] [address]       RE: Kenna Carballo  : 1985  CHERRY: 10/1/2018       Dear Dr. Jerri Davison:    I had the pleasure of seeing your patient Kenna Carballo here at the Gynecologic Cancer Clinic at the [facility].  As you know she is a very pleasant 32 year old woman with a recent diagnosis of cervical dysplasia and AIS.  Given these findings she was subsequently sent to the Gynecologic Cancer Clinic for patient care and follows up. 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 On 10/23/17, she had negative HPV with ASCUS Pap and negative ECC. Most recently in 2018, ECC was negative  She now presents for 3 month recheck colposcopy and ECC. She is interested in fertility and if the results from today are reassuring, planning on attempting pregnancy. She reports no change in symptoms since last visit. She denies lower extremity or vulvar swelling, abnormal bleeding, dyspareunia, enlarged lymph nodes.       LEEP 2017:   FINAL DIAGNOSIS:   Cervix, LEEP:   - Ectocervix, transformation zone and endocervix present.   - High grade squamous intraepithelial lesion (VAISHNAVI 2 and VAISHNAVI 3/moderate and severe dysplasia) with extension into endocervical glands and involving all four quadrants.   - Endocervical adenocarcinoma in situ, small focus.   - Endocervical margin positive for high grade squamous intraepithelial lesion.   - Ectocervical margin close to dysplastic epithelium.       10/2017:  FINAL DIAGNOSIS:   ENDOCERVICAL CURETTINGS:   - Endocervical mucosa with squamous metaplasia, inflammatory and    reactive changes   - Negative for dysplasia or malignancy   PAP:  - ASCUS, HPV negative      2018: ECC  FINAL DIAGNOSIS:   ENDOCERVIX; CURETTAGE:   - Fragments of endocervical mucosa with squamous metaplasia   - Negative for dysplasia or malignancy     2018:  ECC  FINAL DIAGNOSIS:   ENDOCERVICAL CURETTINGS:   - Endocervical mucosal fragments with squamous metaplasia and reactive   change   - Negative for dysplasia or malignancy     Pap: CYTOLOGIC INTERPRETATION:     Negative for intraepithelial lesion or malignancy       Past Medical History:   Diagnosis Date     SVT (supraventricular tachycardia) (H)     s/p ablation       Past Surgical History:    Past Surgical History:   Procedure Laterality Date     CARDIAC SURGERY      ablation for svt      SECTION N/A 2015    Procedure:  SECTION;  Surgeon: Coco Cross MD;  [facility]: [facility]         Health Maintenance:  Health Maintenance Due   Topic Date Due     MAMMO Q1 YR  2018     PHQ-2 Q1 YR  2018     INFLUENZA VACCINE (1) 2018       Last Pap Smear:  Result: abnormal: see above  She has had a history of abnormal Pap smears.      Allergies:        Allergies   Allergen Reactions     Bee Pollen Other (See Comments)     PN: Swelling at sting site     Latex             Social History:     Social History   Substance Use Topics     Smoking status: Never Smoker     Smokeless tobacco: Never Used     Alcohol use 0.0 oz/week     0 Standard drinks or equivalent per week      Comment: 2-4 per wk       History   Drug Use No           Family History:     The patient's family history is notable for .    Family History   Problem Relation Age of Onset     Breast Cancer Mother    anlso AUNT (65), paternal GM 45,     Her mother (Annmarie Ochoa) genetic panel testing is reviewed and is notable for a VUS in MSH2    Physical Exam:     PS: 0  VS: /89  Pulse 97  Temp 98.3  F (36.8  C) (Oral)  Wt 88.6 kg (195 lb 4.8 oz)  LMP  09/10/2018  SpO2 97%  BMI 32.5 kg/m2    General Appearance: healthy and alert, no distress  Musculoskeletal: extremities non tender and without edema  Neurological: normal gait, no gross defects  Psychiatric: appropriate mood and affect           Hematological:normal cervical, supraclavicular and inguinal lymph nodes  Gastrointestinal:abdomen soft, non-tender, non-distended, no organomegaly or masses  Genitourinary: External genitalia and urethral meatus appears normal.  Vagina is smooth without nodularity or masses.  Cervix appears grossly normal without significant event.      Colposcopy  With the patient in dorsal lithotomy position the cervix and upper vagina were inspected and were grossly normal.  Lugol's was applied and there were no new areas of acetowhite changes or decreased uptake respectively.An ECC was performed, but no other biopsies were performed.     Impression: Negative without cancer or dysplasia.      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     A total of 30 minutes was spent with the patient, 20 minutes of which were spent in counseling the patient and/or treatment planning.      Plan:     1.)    Cervical dysplasia/AIS:  Patient continues to desire observation in this setting as she had most recent ECC negative and desires fertility. The patient understands that in the absence of hysterectomy complete excision mass cannot be definitively proven. A colposcopy and ECC is recommended on a Q3-6 month basis until she becomes pregnant, which she will start attempting when today's results are back If results normal from today, patient is planning to attempt pregnancy. She reports that she desires only one future pregnancy and would like to proceed with definitive surgical management with hysterectomy as is appropriate once postpartum.     2.) Genetic risk factors were assessed and  the patient's mother was tested for BRCA given her early-onset multifocal breast cancer. Her mother has now received panel testing and has a VUS ( I do not have these results).  Kenna has been advised to undergo testing and is currently holding off pending her insurance review, but plans to do this when she is approved.    3.) Labs and/or tests ordered include:  none.     4.) Health maintenance issues addressed today include none.       I have examined this patient with our resident who acted as as scribe and agree with the above findings and plan.    Jerzy Harkins      CC  Patient Care Team:  Trang Alvares MD as PCP - General (OB/Gyn)  Shahana, Jerzy Aponte MD as PCP - Obstetrics/Gynecology (Oncology)  Jyoti Vasquez RN as Registered Nurse  FRANKO RAMIREZ

## 2018-10-01 NOTE — NURSING NOTE
"Oncology Rooming Note    October 1, 2018 8:47 AM   Kenna Carballo is a 32 year old female who presents for:    Chief Complaint   Patient presents with     Oncology Clinic Visit     Colpo;      Initial Vitals: /89  Pulse 97  Temp 98.3  F (36.8  C) (Oral)  Wt 88.6 kg (195 lb 4.8 oz)  LMP 09/10/2018  SpO2 97%  BMI 32.5 kg/m2 Estimated body mass index is 32.5 kg/(m^2) as calculated from the following:    Height as of 5/14/18: 1.651 m (5' 5\").    Weight as of this encounter: 88.6 kg (195 lb 4.8 oz). Body surface area is 2.02 meters squared.  No Pain (0) Comment: Data Unavailable   Patient's last menstrual period was 09/10/2018.  Allergies reviewed: Yes  Medications reviewed: Yes    Medications: Medication refills not needed today.  Pharmacy name entered into [facility]: [facility] [address] [place] [address] [address] AT [place] [address] & [facility]    Clinical concerns:      8 minutes for nursing intake (face to face time)     Alessia Gaffney CMA              "

## 2018-10-01 NOTE — MR AVS SNAPSHOT
After Visit Summary   10/1/2018    Kenna Carballo    MRN: [id number]           Patient Information     Date Of Birth          1985        Visit Information        Provider Department      10/1/2018 9:00 AM Jerzy Harkins MD [facility]        Today's Diagnoses     History of colposcopy    -  1    Adenocarcinoma in situ (AIS) of uterine cervix           Follow-ups after your visit        Who to contact     If you have questions or need follow up information about today's clinic visit or your schedule please contact [facility] directly at [phone].  Normal or non-critical lab and imaging results will be communicated to you by MyChart, letter or phone within 4 business days after the clinic has received the results. If you do not hear from us within 7 days, please contact the clinic through MyChart or phone. If you have a critical or abnormal lab result, we will notify you by phone as soon as possible.  Submit refill requests through [employer] or call your pharmacy and they will forward the refill request to us. Please allow 3 business days for your refill to be completed.          Additional Information About Your Visit        Care EveryWhere ID     This is your Care EveryWhere ID. This could be used by other organizations to access your [facility] medical records  [id number]        Your Vitals Were     Pulse Temperature Last Period Pulse Oximetry BMI (Body Mass Index)       97 98.3  F (36.8  C) (Oral) 09/10/2018 97% 32.5 kg/m2        Blood Pressure from Last 3 Encounters:   10/01/18 139/89   05/14/18 113/73   03/06/18 131/81    Weight from Last 3 Encounters:   10/01/18 88.6 kg (195 lb 4.8 oz)   05/14/18 86.6 kg (191 lb)   02/26/18 85.3 kg (188 lb)              We Performed the Following     Endocervixcal curettage     HCG Qual, [facility] - [facility]  ([id number])     Surgical pathology exam        Primary Care Provider Office Phone # Fax #     Trang Alvares [phone]       [facility] [address] [address]        Equal Access to Services     RADHA HERNANDEZ : Diann nabila martin tonio Nilo, watorieda luqadaha, qaybta kaalmada sophia, bert suzannain hayaafran merinomuna michelle atkinson. So [facility] [phone].    ATENCIÓN: Si habla español, tiene a walsh disposición servicios gratuitos de asistencia lingüística. Llame al [phone].    We comply with applicable federal civil rights laws and [place] laws. We do not discriminate on the basis of race, color, national origin, age, disability, sex, sexual orientation, or gender identity.            Thank you!     Thank you for choosing [facility]  for your care. Our goal is always to provide you with excellent care. Hearing back from our patients is one way we can continue to improve our services. Please take a few minutes to complete the written survey that you may receive in the mail after your visit with us. Thank you!             Your Updated Medication List - Protect others around you: Learn how to safely use, store and throw away your medicines at www.disposemymeds.org.          This list is accurate as of 10/1/18  9:46 AM.  Always use your most recent med list.                   Brand Name Dispense Instructions for use Diagnosis    calcium carbonate 500 mg {elemental} 500 MG tablet    OS-TIAGO     Take 500 mg by mouth 2 times daily        fluocinonide 0.05 % cream    LIDEX     APPLY TO ECZEMA ON BODY BID PRN        LORazepam 1 MG tablet    ATIVAN     TK 1 T PO 30 MINUTES PRIOR TO VISIT        MICROGESTIN FE 1/20 1-20 MG-MCG per tablet   Generic drug:  norethindrone-ethinyl estradiol      TK 1 T PO QD        OMEGA-3 FISH OIL PO      Take 1 g by mouth daily        triamcinolone 0.1 % cream    KENALOG    80 g    Apply sparingly to affected area three times daily for 14 days.    Eczema, unspecified eczema

## 2018-10-02 LAB — COPATH REPORT: NORMAL

## 2018-10-10 ENCOUNTER — TELEPHONE (OUTPATIENT)
Dept: ONCOLOGY | Facility: CLINIC | Age: 33
End: 2018-10-10

## 2018-10-10 NOTE — TELEPHONE ENCOUNTER
10/10  531 pm  Received: 10/1/2018   Endocervical curettings   FINAL DIAGNOSIS:   ENDOCERVICAL CURETTINGS:   - Unremarkable scant fragments of endocervical mucosa     MD reviewed return in 6 months    Left message on voice mail

## 2019-05-29 ENCOUNTER — TELEPHONE (OUTPATIENT)
Dept: ONCOLOGY | Facility: CLINIC | Age: 34
End: 2019-05-29

## 2019-05-29 DIAGNOSIS — F41.9 ANXIETY: Primary | ICD-10-CM

## 2019-05-29 NOTE — TELEPHONE ENCOUNTER
----- Message from Carlotta Glass RN sent at 5/29/2019  9:33 AM CDT -----  Regarding: message left on my voice mail   Patient would like an earlier appt with [place] if possible( currently 7/15/19)-- Also wants a refill on that medication he gave her to calm her nerves -- she can't remember the name of it -- Jillian

## 2019-06-04 RX ORDER — LORAZEPAM 1 MG/1
TABLET ORAL
Qty: 1 TABLET | Refills: 0 | Status: SHIPPED | OUTPATIENT
Start: 2019-06-04 | End: 2020-01-17

## 2019-06-09 NOTE — PROGRESS NOTES
Follow Up Note on Referred Patient       Referring Provider:  Dr. Jerri Davison MD  [facility] OBGYN CONSULT  [address] [place] [id number]       RE: Kenna Carballo  : 1985  CHERRY: 2019      Chief Complaint:  History of Adenocarcinoma in situ of the cervix. Surveillance visit.      History of Present Illness:  Ms.Kristi ASYA Carballo is a 33 year old female returning to the Gynecologic Cancer Clinic at the [facility] for surveillance of cervical adenocarcinoma in situ.  History as follows:    2017:  LEEP.  -Final pathology=AIS, CIN3 with positive endocervical margin.    10/23/17:  Pap test=ASCUS, hrHPV-, endocervical curettings-.    18:  Endocervical curettings-    18:   Pap test=NILM, hrHPV-, endocervical curettings-.      Interval History:  Kenna returns to the gyn onc clinic today for her scheduled surveillance visit. No abnormal vaginal bleeding, no postcoital bleeding. No abnormal vaginal discharge. She has concerns about her breast, as the right breast seems lumpier and has changed since the birth of her daughter and breastfeeding. She recently had a normal mammogram at an outside clinic (results not available for review), but given that her mother had breast cancer in her 30s she is anxious about the breast changes.      Past Medical History:  Past Medical History:   Diagnosis Date     SVT (supraventricular tachycardia) (H)     s/p ablation       Past Surgical History:  Past Surgical History:   Procedure Laterality Date     CARDIAC SURGERY      ablation for svt      SECTION N/A 2015    Procedure:  SECTION;  Surgeon: Coco Cross MD;  Location:  L+D         Allergies:   Allergies   Allergen Reactions     Bee Pollen Other (See Comments)     PN: Swelling at sting site     Latex        Social History:  Social History     Tobacco Use     Smoking status: Never Smoker     Smokeless tobacco: Never Used  "  Substance Use Topics     Alcohol use: Yes     Alcohol/week: 0.0 oz     Comment: 2-4 per wk       History   Drug Use No       Family History:     The patient's family history is notable for first- and second-degree maternal relatives and a second-degree paternal relative with breast cancer. Mother's genetic testing significant for a VUS in MSH2.  Family History   Problem Relation Age of Onset     Breast Cancer Mother         Genetic testing: VUS MSH2     Breast Cancer Paternal Grandmother 45     Breast Cancer Maternal Aunt 65       Physical Exam:     VS: /83   Pulse 88   Temp 98.1  F (36.7  C) (Oral)   Resp 16   Ht 1.651 m (5' 5\")   Wt 83 kg (183 lb)   LMP  (LMP Unknown)   SpO2 98%   Breastfeeding? No   BMI 30.45 kg/m      General Appearance: healthy and alert, no distress    Breast: Symmetric in appearance, no skin changes, no nipple retraction.  No palpable masses, no nipple discharge. Right breast denser, especially at the upper outer quadrant, but no dominant mass.  No axillary lymphadenopathy.    Genitourinary: External genitalia and urethral meatus appears normal.  Vagina is smooth without nodularity or masses.  Cervix appears grossly normal, small amount of old blood. Pap & HPV test obtained.      Procedure Risk Statement:  The patient was counseled regarding risks, benefits and alternatives to endocervical curettage.  Risks discussed include but not limited to:  Bleeding; infection; injury to adjacent organs; inadequate sample or false negative result.  The patient's questions were answered, and informed consent signed.      Procedure:  After patient identification was confirmed and informed consent signed, a single tooth tenaculum was placed on the anterior lip of the cervix to obtain access to the cervical os. Sharp endocervical curettage followed by curettage with the cytobrush performed. The tenaculum was removed without significant bleeding. The patient tolerated the procedure " well.      Laboratory Examination:  Urine pregnancy test=negative.      Performance Status:  ECOG Grade 0.      Assessment:    Kenna Carballo is a 33 year old woman with history of CIN2-3 with positive margins and focus of AIS in 2017, currently without evidence of disease with negative HPV testing since dysplasia diagnosis.    Fertility desires: Desires future fertility.    Right breast with fibrocystic changes, no dominant mass or other signs of malignancy.    A total of 20 minutes was spent with the patient, 10 minutes of which were spent in counseling the patient and/or treatment planning, 7 minutes of which were spent in the above procedure.      Plan:     1.)    CIN3, AIS: I will call the patient with the Pap, HPV test and endocervical curettage results; okay to leave a detailed message.  Assuming all tests are normal/negative, Kenna will return to the gyn onc clinic in 6 months for her next surveillance visit.    Surveillance as follows: Pap + HPV co-test and endocervical curettage every 6 months x3 years, then annually x2 years, then can extend to every 3 years indefinitely if HPV testing remains negative.   Plan for definitive treatment with hysterectomy after completion of childbearing, although can discuss the risks/benefits if surveillance testing remains negative.     2.) Genetic risk factors were assessed. 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 No additional testing for family members is generally recommended for VUS, but Kenna is considering testing if covered by her insurance. Continue annual breast cancer screening with her primary care provider.    3.) Labs and/or tests ordered include:  Pap test, HPV test, pathology of endocervical curettings.     4.) Health maintenance issues addressed today include none.       Ling Saleh MD, MS      CC  Patient Care Team:  Trang Alvares MD as PCP - General (OB/Gyn)  Jerzy Harkins MD as PCP - Obstetrics/Gynecology  (Oncology)  Jyoti Vasquez RN as Registered Nurse  Sarah Duran PA-C as Assigned PCP  FRANKO RAMIREZ

## 2019-06-10 NOTE — PATIENT INSTRUCTIONS
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  Treatment History:  -6/2017: Loop electrosurgical excisional procedure (LEEP).      PLAN:  1) AIS: I will call you with the results of the Pap, HPV tests and endocervical curettage.   Return to the gyn onc clinic in 6 months for your next surveillance visit.    Surveillance as follows: every 6 months x3 years, then annually x2 years, then every 3 years indefinitely as long as testing remains negative, with Pap test + HPV test + endocervical curettage at each visit.    Plan for definitive treatment with hysterectomy after completion of childbearing, although if surveillance testing remains negative >5 years, can discuss risks/benefits of surgical management.    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 Recommend your mother call her genetic counselor every 1-2 years for updates on classification of this gene variant. If reclassified as a variant which increases cancer risk, then recommend that you get testing for the same gene mutation.      It was a pleasure meeting you.      Ling Saleh MD, MS

## 2019-06-11 ENCOUNTER — OFFICE VISIT (OUTPATIENT)
Dept: ONCOLOGY | Facility: CLINIC | Age: 34
End: 2019-06-11
Attending: OBSTETRICS & GYNECOLOGY
Payer: COMMERCIAL

## 2019-06-11 VITALS
HEART RATE: 88 BPM | DIASTOLIC BLOOD PRESSURE: 83 MMHG | OXYGEN SATURATION: 98 % | TEMPERATURE: 98.1 F | SYSTOLIC BLOOD PRESSURE: 125 MMHG | WEIGHT: 183 LBS | RESPIRATION RATE: 16 BRPM | BODY MASS INDEX: 30.49 KG/M2 | HEIGHT: 65 IN

## 2019-06-11 DIAGNOSIS — D06.9 ADENOCARCINOMA IN SITU (AIS) OF UTERINE CERVIX: ICD-10-CM

## 2019-06-11 DIAGNOSIS — Z98.890 HISTORY OF COLPOSCOPY: ICD-10-CM

## 2019-06-11 DIAGNOSIS — D06.9 ADENOCARCINOMA IN SITU (AIS) OF UTERINE CERVIX: Primary | ICD-10-CM

## 2019-06-11 DIAGNOSIS — N60.11 FIBROCYSTIC DISEASE OF RIGHT BREAST: ICD-10-CM

## 2019-06-11 LAB — HCG UR QL: NEGATIVE

## 2019-06-11 PROCEDURE — 87624 HPV HI-RISK TYP POOLED RSLT: CPT | Performed by: OBSTETRICS & GYNECOLOGY

## 2019-06-11 PROCEDURE — 88305 TISSUE EXAM BY PATHOLOGIST: CPT | Performed by: OBSTETRICS & GYNECOLOGY

## 2019-06-11 PROCEDURE — 57505 ENDOCERVICAL CURETTAGE: CPT | Mod: ZF | Performed by: OBSTETRICS & GYNECOLOGY

## 2019-06-11 PROCEDURE — 88175 CYTOPATH C/V AUTO FLUID REDO: CPT | Performed by: OBSTETRICS & GYNECOLOGY

## 2019-06-11 PROCEDURE — 99213 OFFICE O/P EST LOW 20 MIN: CPT | Mod: 25 | Performed by: OBSTETRICS & GYNECOLOGY

## 2019-06-11 PROCEDURE — G0463 HOSPITAL OUTPT CLINIC VISIT: HCPCS | Mod: ZF

## 2019-06-11 PROCEDURE — G0463 HOSPITAL OUTPT CLINIC VISIT: HCPCS | Mod: ZF,25

## 2019-06-11 PROCEDURE — 81025 URINE PREGNANCY TEST: CPT | Performed by: OBSTETRICS & GYNECOLOGY

## 2019-06-11 ASSESSMENT — MIFFLIN-ST. JEOR: SCORE: 1525.96

## 2019-06-11 ASSESSMENT — PAIN SCALES - GENERAL: PAINLEVEL: NO PAIN (0)

## 2019-06-11 NOTE — NURSING NOTE
"Oncology Rooming Note    June 11, 2019 9:40 AM   Kenna Carballo is a 33 year old female who presents for:    Chief Complaint   Patient presents with     Oncology Clinic Visit     Return Cervical Dysplasia     Initial Vitals: /83   Pulse 88   Temp 98.1  F (36.7  C) (Oral)   Resp 16   Ht 1.651 m (5' 5\")   Wt 83 kg (183 lb)   LMP  (LMP Unknown)   SpO2 98%   Breastfeeding? No   BMI 30.45 kg/m   Estimated body mass index is 30.45 kg/m  as calculated from the following:    Height as of this encounter: 1.651 m (5' 5\").    Weight as of this encounter: 83 kg (183 lb). Body surface area is 1.95 meters squared.  No Pain (0) Comment: Data Unavailable   No LMP recorded (lmp unknown). (Menstrual status: Birth Control).  Allergies reviewed: Yes  Medications reviewed: Yes    Medications: Medication refills not needed today.  Pharmacy name entered into EPIC: [facility] [address] [place] [address]    Clinical concerns: David Yu, RENAE              "

## 2019-06-11 NOTE — NURSING NOTE
Return appt in 6 months  Pap smear and ECC done today, orders entered and sent to cytology and pathology  Will be called with test results

## 2019-06-11 NOTE — LETTER
2019       RE: Kenna Carballo  [address]  [facility] [address]     Dear Colleague,    Thank you for referring your patient, Kenna Carballo, to the [facility]. Please see a copy of my visit note below.                          Follow Up Note on Referred Patient       Referring Provider:  Dr. Jerri Davison MD  [facility] OBGYN CONSULT  [address] [facility] [address]  [place] [address]       RE: Kenna Carballo  : 1985  CHERRY: 2019      Chief Complaint:  History of Adenocarcinoma in situ of the cervix. Surveillance visit.      History of Present Illness:  Ms.Kristi ASYA Carballo is a 33 year old female returning to the Gynecologic Cancer Clinic at the [facility] for surveillance of cervical adenocarcinoma in situ.  History as follows:    2017:  LEEP.  -Final pathology=AIS, CIN3 with positive endocervical margin.    10/23/17:  Pap test=ASCUS, hrHPV-, endocervical curettings-.    18:  Endocervical curettings-    18:   Pap test=NILM, hrHPV-, endocervical curettings-.      Interval History:  Kenna returns to the gyn onc clinic today for her scheduled surveillance visit. No abnormal vaginal bleeding, no postcoital bleeding. No abnormal vaginal discharge. She has concerns about her breast, as the right breast seems lumpier and has changed since the birth of her daughter and breastfeeding. She recently had a normal mammogram at an outside clinic (results not available for review), but given that her mother had breast cancer in her 30s she is anxious about the breast changes.      Past Medical History:  Past Medical History:   Diagnosis Date     SVT (supraventricular tachycardia) (H)     s/p ablation       Past Surgical History:  Past Surgical History:   Procedure Laterality Date     CARDIAC SURGERY      ablation for svt      SECTION N/A 2015    Procedure:  SECTION;  Surgeon: Coco Cross MD;  Location:  L+D         Allergies:  "  Allergies   Allergen Reactions     Bee Pollen Other (See Comments)     PN: Swelling at sting site     Latex        Social History:  Social History     Tobacco Use     Smoking status: Never Smoker     Smokeless tobacco: Never Used   Substance Use Topics     Alcohol use: Yes     Alcohol/week: 0.0 oz     Comment: 2-4 per wk       History   Drug Use No       Family History:     The patient's family history is notable for first- and second-degree maternal relatives and a second-degree paternal relative with breast cancer. Mother's genetic testing significant for a VUS in MSH2.  Family History   Problem Relation Age of Onset     Breast Cancer Mother         Genetic testing: VUS MSH2     Breast Cancer Paternal Grandmother 45     Breast Cancer Maternal Aunt 65       Physical Exam:     VS: /83   Pulse 88   Temp 98.1  F (36.7  C) (Oral)   Resp 16   Ht 1.651 m (5' 5\")   Wt 83 kg (183 lb)   LMP  (LMP Unknown)   SpO2 98%   Breastfeeding? No   BMI 30.45 kg/m       General Appearance: healthy and alert, no distress    Breast: Symmetric in appearance, no skin changes, no nipple retraction.  No palpable masses, no nipple discharge. Right breast denser, especially at the upper outer quadrant, but no dominant mass.  No axillary lymphadenopathy.    Genitourinary: External genitalia and urethral meatus appears normal.  Vagina is smooth without nodularity or masses.  Cervix appears grossly normal, small amount of old blood. Pap & HPV test obtained.      Procedure Risk Statement:  The patient was counseled regarding risks, benefits and alternatives to endocervical curettage.  Risks discussed include but not limited to:  Bleeding; infection; injury to adjacent organs; inadequate sample or false negative result.  The patient's questions were answered, and informed consent signed.      Procedure:  After patient identification was confirmed and informed consent signed, a single tooth tenaculum was placed on the anterior lip " of the cervix to obtain access to the cervical os. Sharp endocervical curettage followed by curettage with the cytobrush performed. The tenaculum was removed without significant bleeding. The patient tolerated the procedure well.      Laboratory Examination:  Urine pregnancy test=negative.      Performance Status:  ECOG Grade 0.      Assessment:    Kenna Carballo is a 33 year old woman with history of CIN2-3 with positive margins and focus of AIS in 2017, currently without evidence of disease with negative HPV testing since dysplasia diagnosis.    Fertility desires: Desires future fertility.    Right breast with fibrocystic changes, no dominant mass or other signs of malignancy.    A total of 20 minutes was spent with the patient, 10 minutes of which were spent in counseling the patient and/or treatment planning, 7 minutes of which were spent in the above procedure.      Plan:     1.)    CIN3, AIS: I will call the patient with the Pap, HPV test and endocervical curettage results; okay to leave a detailed message.  Assuming all tests are normal/negative, Kenna will return to the gyn onc clinic in 6 months for her next surveillance visit.    Surveillance as follows: Pap + HPV co-test and endocervical curettage every 6 months x3 years, then annually x2 years, then can extend to every 3 years indefinitely if HPV testing remains negative.   Plan for definitive treatment with hysterectomy after completion of childbearing, although can discuss the risks/benefits if surveillance testing remains negative.     2.) Genetic risk factors were assessed. 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 No additional testing for family members is generally recommended for VUS, but Kenna is considering testing if covered by her insurance. Continue annual breast cancer screening with her primary care provider.    3.) Labs and/or tests ordered include:  Pap test, HPV test, pathology of endocervical curettings.     4.) Health  maintenance issues addressed today include none.       Ling Saleh MD, MS      CC  Patient Care Team:  Trang Alvares MD as PCP - General (OB/Gyn)  Shahana, Jerzy Aponte MD as PCP - Obstetrics/Gynecology (Oncology)  Christina, Jyoti MENDIETA RN as Registered Nurse  Roger, Sarah BUSTILLOS PA-C as Assigned PCP  FRANKO RAMIREZ

## 2019-06-11 NOTE — LETTER
6/14/2019      Kenna Carballo  [address]  [facility] [address]         Dear Kenna,    Thank you for being our patient at the Gynecologic Cancer Clinic.  We are writing to inform you of your test results.    Your biopsy was within normal limits.     If you have any questions or concerns please contact the clinic at [phone].     Sincerely,    Ling Saleh MD

## 2019-06-13 LAB
COPATH REPORT: NORMAL
COPATH REPORT: NORMAL
PAP: NORMAL

## 2019-06-17 ENCOUNTER — TELEPHONE (OUTPATIENT)
Dept: ONCOLOGY | Facility: CLINIC | Age: 34
End: 2019-06-17

## 2019-06-17 NOTE — TELEPHONE ENCOUNTER
Left a message for the patient informing her that the Pap test was normal, the HPV test was negative, and that the endocervical sampling was negative for dysplasia. Kenna is considering becoming pregnant, and based on these results there is no reason that she needs to delay. If she is not pregnant in 6 months, then will repeat the exam (Pap, HPV test and endocervical sampling) in 6 months; if she is pregnant a that time, then will delay surveillance visit until 6 weeks postpartum.    Will also send her a copy of the results via mail.  Instructed her to call the clinic with any questions.    Ling Saleh MD, MS

## 2019-11-03 ENCOUNTER — HEALTH MAINTENANCE LETTER (OUTPATIENT)
Age: 34
End: 2019-11-03

## 2019-11-26 ENCOUNTER — OFFICE VISIT (OUTPATIENT)
Dept: FAMILY MEDICINE | Facility: CLINIC | Age: 34
End: 2019-11-26
Payer: COMMERCIAL

## 2019-11-26 VITALS
HEART RATE: 89 BPM | BODY MASS INDEX: 27.66 KG/M2 | TEMPERATURE: 98.2 F | HEIGHT: 65 IN | WEIGHT: 166 LBS | SYSTOLIC BLOOD PRESSURE: 137 MMHG | OXYGEN SATURATION: 99 % | DIASTOLIC BLOOD PRESSURE: 80 MMHG

## 2019-11-26 DIAGNOSIS — L30.9 ECZEMA, UNSPECIFIED TYPE: ICD-10-CM

## 2019-11-26 DIAGNOSIS — N64.4 BREAST PAIN, RIGHT: ICD-10-CM

## 2019-11-26 DIAGNOSIS — Z00.00 ROUTINE GENERAL MEDICAL EXAMINATION AT A HEALTH CARE FACILITY: Primary | ICD-10-CM

## 2019-11-26 DIAGNOSIS — Z80.3 FAMILY HISTORY OF BREAST CANCER IN MOTHER: ICD-10-CM

## 2019-11-26 DIAGNOSIS — Z30.9 ENCOUNTER FOR CONTRACEPTIVE MANAGEMENT, UNSPECIFIED TYPE: ICD-10-CM

## 2019-11-26 LAB
BASOPHILS # BLD AUTO: 0 10E9/L (ref 0–0.2)
BASOPHILS NFR BLD AUTO: 0.4 %
DIFFERENTIAL METHOD BLD: NORMAL
EOSINOPHIL # BLD AUTO: 0.1 10E9/L (ref 0–0.7)
EOSINOPHIL NFR BLD AUTO: 0.9 %
ERYTHROCYTE [DISTWIDTH] IN BLOOD BY AUTOMATED COUNT: 12.9 % (ref 10–15)
HCT VFR BLD AUTO: 43.5 % (ref 35–47)
HGB BLD-MCNC: 14.3 G/DL (ref 11.7–15.7)
LYMPHOCYTES # BLD AUTO: 2.4 10E9/L (ref 0.8–5.3)
LYMPHOCYTES NFR BLD AUTO: 30.6 %
MCH RBC QN AUTO: 31.7 PG (ref 26.5–33)
MCHC RBC AUTO-ENTMCNC: 32.9 G/DL (ref 31.5–36.5)
MCV RBC AUTO: 97 FL (ref 78–100)
MONOCYTES # BLD AUTO: 0.5 10E9/L (ref 0–1.3)
MONOCYTES NFR BLD AUTO: 6.5 %
NEUTROPHILS # BLD AUTO: 4.8 10E9/L (ref 1.6–8.3)
NEUTROPHILS NFR BLD AUTO: 61.6 %
PLATELET # BLD AUTO: 234 10E9/L (ref 150–450)
RBC # BLD AUTO: 4.51 10E12/L (ref 3.8–5.2)
WBC # BLD AUTO: 7.7 10E9/L (ref 4–11)

## 2019-11-26 PROCEDURE — 85025 COMPLETE CBC W/AUTO DIFF WBC: CPT | Performed by: NURSE PRACTITIONER

## 2019-11-26 PROCEDURE — 80048 BASIC METABOLIC PNL TOTAL CA: CPT | Performed by: NURSE PRACTITIONER

## 2019-11-26 PROCEDURE — 84443 ASSAY THYROID STIM HORMONE: CPT | Performed by: NURSE PRACTITIONER

## 2019-11-26 PROCEDURE — 36415 COLL VENOUS BLD VENIPUNCTURE: CPT | Performed by: NURSE PRACTITIONER

## 2019-11-26 PROCEDURE — 99395 PREV VISIT EST AGE 18-39: CPT | Performed by: NURSE PRACTITIONER

## 2019-11-26 RX ORDER — TRIAMCINOLONE ACETONIDE 1 MG/G
CREAM TOPICAL
Qty: 80 G | Refills: 3 | Status: SHIPPED | OUTPATIENT
Start: 2019-11-26

## 2019-11-26 RX ORDER — NORETHINDRONE ACETATE AND ETHINYL ESTRADIOL AND FERROUS FUMARATE 1MG-20(21)
1 KIT ORAL DAILY
Qty: 28 TABLET | Refills: 12 | Status: SHIPPED | OUTPATIENT
Start: 2019-11-26 | End: 2020-11-25

## 2019-11-26 ASSESSMENT — MIFFLIN-ST. JEOR: SCORE: 1443.85

## 2019-11-26 NOTE — PROGRESS NOTES
SUBJECTIVE:   CC: Kenna Carballo is an 34 year old woman who presents for preventive health visit.     Healthy Habits:    Do you get at least three servings of calcium containing foods daily (dairy, green leafy vegetables, etc.)? yes    Amount of exercise or daily activities, outside of work: 4-5 day(s) per week    Problems taking medications regularly No    Medication side effects: No    Have you had an eye exam in the past two years? no    Do you see a dentist twice per year? yes    Do you have sleep apnea, excessive snoring or daytime drowsiness?no    Requests refill on eczema cream.    Today's PHQ-2 Score: 0-0  PHQ-2 ( 1999 [employer]) 11/26/2019 5/22/2017   Q1: Little interest or pleasure in doing things 0 0   Q2: Feeling down, depressed or hopeless 0 0   PHQ-2 Score 0 0       Abuse: Current or Past(Physical, Sexual or Emotional)- No  Do you feel safe in your environment? Yes    Social History     Tobacco Use     Smoking status: Never Smoker     Smokeless tobacco: Never Used   Substance Use Topics     Alcohol use: Yes     Alcohol/week: 0.0 standard drinks     Comment: 2-4 per wk     If you drink alcohol do you typically have >3 drinks per day or >7 drinks per week? No                     Reviewed orders with patient.  Reviewed health maintenance and updated orders accordingly - Yes  Lab work is in process  Labs reviewed in EPIC  BP Readings from Last 3 Encounters:   11/26/19 137/80   06/11/19 125/83   10/01/18 139/89    Wt Readings from Last 3 Encounters:   11/26/19 75.3 kg (166 lb)   06/11/19 83 kg (183 lb)   10/01/18 88.6 kg (195 lb 4.8 oz)                  Patient Active Problem List   Diagnosis     CARDIOVASCULAR SCREENING; LDL GOAL LESS THAN 160     Family history of breast cancer in mother     Indication for care in labor or delivery     Labor and delivery, indication for care     Supervision of normal pregnancy, third trimester     Eczema     Adenocarcinoma in situ (AIS) of uterine cervix     Past  Surgical History:   Procedure Laterality Date     CARDIAC SURGERY      ablation for svt      SECTION N/A 2015    Procedure:  SECTION;  Surgeon: Coco Cross MD;  Location: [facility] [place], CERVICAL  2017    For AIS, CIN3       Social History     Tobacco Use     Smoking status: Never Smoker     Smokeless tobacco: Never Used   Substance Use Topics     Alcohol use: Yes     Alcohol/week: 0.0 standard drinks     Comment: 2-4 per wk     Family History   Problem Relation Age of Onset     Breast Cancer Mother 36        Genetic testing: VUS MSH2     Breast Cancer Paternal Grandmother 45     Breast Cancer Maternal Aunt 65         Current Outpatient Medications   Medication Sig Dispense Refill     LORazepam (ATIVAN) 1 MG tablet TK 1 T PO 30 MINUTES PRIOR TO VISIT 1 tablet 0     MICROGESTIN FE  1-20 MG-MCG tablet Take 1 tablet by mouth daily 28 tablet 12     triamcinolone (KENALOG) 0.1 % external cream Apply sparingly to affected area three times daily for 14 days. 80 g 3     Allergies   Allergen Reactions     Bee Pollen Other (See Comments)     PN: Swelling at sting site     Latex        Mammo Decision Support: Has had previous mammograms every couple years due to strong family history of breast cancer in mother with breast cancer at age 36.  Patient currently has a right breast complaint of pain.    Pertinent mammograms are reviewed under the imaging tab.  History of abnormal Pap smear:   Last 3 Pap and HPV Results: YES follows up with the [facility]  on abnormal Pap smear.   PAP / HPV Latest Ref Rng & Units 2019 2018 10/23/2017   PAP - NIL NIL ASC-US(A)   HPV 16 DNA NEG:Negative Negative Negative Negative   HPV 18 DNA NEG:Negative Negative Negative Negative   OTHER HR HPV NEG:Negative Negative Negative Negative     Reviewed and updated as needed this visit by clinical staff  Tobacco  Allergies  Meds  Problems  Med Hx  Surg Hx  Fam Hx  Soc Hx          Reviewed and  "updated as needed this visit by Provider  Tobacco  Allergies  Meds  Problems  Med Hx  Surg Hx  Fam Hx          ROS:  Constitutional, HEENT, cardiovascular, pulmonary, GI, , musculoskeletal, neuro, skin, endocrine and psych systems are negative, except as otherwise noted in the HPI.    OBJECTIVE:   /80 (BP Location: Left arm, Patient Position: Chair, Cuff Size: Adult Regular)   Pulse 89   Temp 98.2  F (36.8  C) (Oral)   Ht 1.651 m (5' 5\")   Wt 75.3 kg (166 lb)   LMP 11/05/2019 (Approximate)   SpO2 99%   Breastfeeding No   BMI 27.62 kg/m    EXAM:  GENERAL: healthy, alert and no distress  EYES: Eyes grossly normal to inspection, PERRL and conjunctivae and sclerae normal  HENT: ear canals and TM's normal, nose and mouth without ulcers or lesions  NECK: no adenopathy, no asymmetry, masses, or scars and thyroid normal to palpation  RESP: lungs clear to auscultation - no rales, rhonchi or wheezes  BREAST: no tenderness or nipple discharge of left breast; right breast has tenderness and increased density from the 9 to 12 o'clock position, no nipple discharge or abnormality and no palpable axillary masses or adenopathy  CV: regular rate and rhythm, normal S1 S2, no S3 or S4, no murmur, click or rub, no peripheral edema and peripheral pulses strong  ABDOMEN: soft, nontender, no hepatosplenomegaly, no masses and bowel sounds normal   (female): declines  MS: no gross musculoskeletal defects noted, no edema  SKIN: no suspicious lesions or rashes  NEURO: Normal strength and tone, mentation intact and speech normal  PSYCH: mentation appears normal, affect normal/bright    Diagnostic Test Results:  Labs reviewed in Epic    ASSESSMENT/PLAN:   Kenna was seen today for physical and new patient.    Diagnoses and all orders for this visit:    Routine general medical examination at a health care facility  Well woman exam with breast exam and Pap smear completed today.    Fasting labs today.    Will notify of lab " "results.    -     TSH with free T4 reflex  -     CBC with platelets and differential  -     Basic metabolic panel  (Ca, Cl, CO2, Creat, Gluc, K, Na, BUN)    Breast pain, right  Family history of breast cancer in mother  Strong family history with mother breast cancer age 36.  Right breast pain.  Diagnostic mammogram ordered.  -     MA Diagnostic Digital Bilateral    Eczema, unspecified type  No concerns.  Stable.  Continue same medication this was refilled today.  -     triamcinolone (KENALOG) 0.1 % external cream; Apply sparingly to affected area three times daily for 14 days.    Encounter for contraceptive management, unspecified type  No concerns.  Stable.  Continue same medication this was refilled today.  -     MICROGESTIN FE 1/20 1-20 MG-MCG tablet; Take 1 tablet by mouth daily        COUNSELING:   Reviewed preventive health counseling, as reflected in patient instructions    Estimated body mass index is 27.62 kg/m  as calculated from the following:    Height as of this encounter: 1.651 m (5' 5\").    Weight as of this encounter: 75.3 kg (166 lb).    Weight management plan: Discussed healthy diet and exercise guidelines     reports that she has never smoked. She has never used smokeless tobacco.      Counseling Resources:  ATP IV Guidelines  Pooled Cohorts Equation Calculator  Breast Cancer Risk Calculator  FRAX Risk Assessment  ICSI Preventive Guidelines  Dietary Guidelines for Americans, 2010  USDA's MyPlate  ASA Prophylaxis  Lung CA Screening      Zakiya Galicia, [facility] [place]  "

## 2019-11-27 LAB
ANION GAP SERPL CALCULATED.3IONS-SCNC: 9 MMOL/L (ref 3–14)
BUN SERPL-MCNC: 10 MG/DL (ref 7–30)
CALCIUM SERPL-MCNC: 9.8 MG/DL (ref 8.5–10.1)
CHLORIDE SERPL-SCNC: 103 MMOL/L (ref 94–109)
CO2 SERPL-SCNC: 25 MMOL/L (ref 20–32)
CREAT SERPL-MCNC: 0.58 MG/DL (ref 0.52–1.04)
GFR SERPL CREATININE-BSD FRML MDRD: >90 ML/MIN/{1.73_M2}
GLUCOSE SERPL-MCNC: 106 MG/DL (ref 70–99)
POTASSIUM SERPL-SCNC: 3.7 MMOL/L (ref 3.4–5.3)
SODIUM SERPL-SCNC: 137 MMOL/L (ref 133–144)
TSH SERPL DL<=0.005 MIU/L-ACNC: 2.92 MU/L (ref 0.4–4)

## 2019-11-27 NOTE — RESULT ENCOUNTER NOTE
Dear Kenna,    Here is a summary of your recent test results:    -Normal red blood cell (hgb) levels, normal white blood cell count and normal platelet levels.  -Kidney function (GFR) is normal.  -Sodium is normal.  -Potassium is normal.  -Calcium is normal.  -Glucose is slight elevated and may be a sign of early diabetes (prediabetes). ADVISE:: eating a low carbohydrate diet, exercising, trying to lose weight (if necessary) and rechecking your glucose level in 12 months.  -TSH (thyroid stimulating hormone) level is normal which indicates normal thyroid function.    For additional lab test information, [email or web address] is an excellent reference.    Please call us at [phone] (or use [employer]) to address the above recommendations if needed.    Thank you for choosing [facility].  It was an honor and a privilege to participate in your care today.       Healthy regards,    Zakiya Galicia, EDWIGEP  [facility]

## 2019-12-05 ENCOUNTER — HOSPITAL ENCOUNTER (OUTPATIENT)
Dept: MAMMOGRAPHY | Facility: CLINIC | Age: 34
Discharge: HOME | End: 2019-12-05
Attending: NURSE PRACTITIONER | Admitting: NURSE PRACTITIONER
Payer: COMMERCIAL

## 2019-12-05 ENCOUNTER — HOSPITAL ENCOUNTER (OUTPATIENT)
Dept: ULTRASOUND IMAGING | Facility: CLINIC | Age: 34
End: 2019-12-05
Attending: NURSE PRACTITIONER
Payer: COMMERCIAL

## 2019-12-05 DIAGNOSIS — N64.4 BREAST PAIN, RIGHT: ICD-10-CM

## 2019-12-05 PROCEDURE — 76642 ULTRASOUND BREAST LIMITED: CPT | Mod: RT

## 2019-12-05 PROCEDURE — 77066 DX MAMMO INCL CAD BI: CPT

## 2019-12-05 PROCEDURE — G0279 TOMOSYNTHESIS, MAMMO: HCPCS

## 2019-12-05 NOTE — RESULT ENCOUNTER NOTE
Dear Kenna,    Here is a summary of your recent test results:    -Mammogram was normal.  ADVISE: rechecking in 1 year.    For additional lab test information, [email or web address] is an excellent reference.    Please call us at [phone] (or use [employer]) to address the above recommendations if needed.    Thank you for choosing [facility].  It was an honor and a privilege to participate in your care today.       Healthy regards,    Zakiya Galicia, SEB  [facility]

## 2019-12-05 NOTE — LETTER
Kenna Carballo  [address]  [place] [address]      December 5, 2019  Date of Exam:     Dear Kenna Carballo:    Thank you for your recent visit.  Breast Imaging Result: We are pleased to inform you that the results of your recent breast imaging show no evidence of malignancy (cancer).    If you are experiencing any breast problems such as a lump or localized pain we request that you discuss this with your health care provider if you haven t already done so, as additional testing may be necessary.    As you know, early detection of cancer is very important. Although mammography is the most accurate method for early detection, not all cancers are found through mammography. A thorough examination includes a combination of mammography, physical examination and breast self-examination. Currently the American College of Radiology and the Society of Breast Imaging recommend an annual mammogram for all women beginning at the age of 40.    A report of your breast imaging results was sent to: Zakiya Galicia    Your breast imaging will become part of your medical file here at [facility] for at least 10 years. You are responsible for informing any new health care provider or breast imaging facility of the date and location of this examination.    We appreciate the opportunity to participate in your health care.    Sincerely,    Jerzy Corrales MD  Interpreting Radiologist  [facility] Breast Ultrasound

## 2019-12-06 NOTE — RESULT ENCOUNTER NOTE
Dear Kenna,    Your breast ultrasound is normal which is great news. It is recommended to have mammogram yearly.     For additional lab test information, [email or web address] is an excellent reference.    Please call us at [phone] (or use [employer]) to address the above recommendations if needed.    Thank you for choosing [facility].  It was an honor and a privilege to participate in your care today.       Healthy regards,    Zakiya Galicia, EDWIGEP  [facility]

## 2020-01-09 ENCOUNTER — TELEPHONE (OUTPATIENT)
Dept: FAMILY MEDICINE | Facility: CLINIC | Age: 35
End: 2020-01-09

## 2020-01-09 DIAGNOSIS — Z80.3 FAMILY HISTORY OF BREAST CANCER IN MOTHER: Primary | ICD-10-CM

## 2020-01-09 NOTE — TELEPHONE ENCOUNTER
Attempt # 1    Left non-detailed VM for patient to call back and speak with any triage nurse.    LIS Kaba, RN, N  [facility]  Office: [phone]  Fax: [phone]

## 2020-01-09 NOTE — TELEPHONE ENCOUNTER
Unfortunately any testing for a concern such as breast pain or a lump found on exam needing a diagnostic screen is not covered as wellness as it is a problem not a routine screening. I don't recall a conversation about not wanting it or concern for paying for it so I do apologize for this miscommunication. She is however continuing to have pain and requesting more advanced imaging?  I will speak with the breast center tomorrow about breast MRI. In the meantime she can check with her insurance about the cost.     Zakiya Galicia, FNP-BC

## 2020-01-09 NOTE — TELEPHONE ENCOUNTER
Reason for call:  Other   Patient called regarding (reason for call): call back  Additional comments: Patient has questions she would like to ask Zakiya Galicia about her mammogram results and her physical      Phone number to reach patient:  Cell number on file:    Telephone Information:   Mobile [phone]       Best Time:  Anytime     Can we leave a detailed message on this number?  YES

## 2020-01-09 NOTE — TELEPHONE ENCOUNTER
"I spoke with patient.    Patient advised her mother had breast cancer that was not found in mammogram but was found on MRI of breast.      She said she is still having pain and \"Weird lump\" in her breast and is wondering if she should get the breast MRI?      Also, she notes that mammogram was billed as diagnostic and insurance will not cover it.  She said she was advised in her physical that if it is done as part of her physical it would be covered by her insurance.  I did advise that it was ordered as diagnostic as she was having symptoms.  She said she voiced concern in physical about paying for imaging and did not want to do it if it was not covered by her insurance.      Routing to Zakiya Galicia to review and advise.      LIS Kaba, RN, PHN  [facility]  Office: [phone]  Fax: [phone]                      "

## 2020-01-10 NOTE — TELEPHONE ENCOUNTER
With family history of mother with breast cancer at age 36 she should be in with the breast center for appropriate follow up for high risk patient which may include genetic testing and more advanced imaging. I have placed this referral so they should be calling her.       Zakiya Galicia, FNP-BC

## 2020-01-10 NOTE — TELEPHONE ENCOUNTER
Attempt # 1    Left non-detailed VM for patient to call back and speak with any triage nurse.    Please advise on both notes below from Zakiya Galicia except that Breast Center does not provide genetic counseling.   Referral pending.  Looks like she was referred back in 2017 but unsure if she ever scheduled appointment.        LIS Kaba, RN, PHN  [facility]  Office: [phone]  Fax: [phone]

## 2020-01-13 NOTE — TELEPHONE ENCOUNTER
Kenna called back again, no RN available, please return her call.    Ana Kellogg  Patient Representative

## 2020-01-13 NOTE — TELEPHONE ENCOUNTER
I spoke with patient and she is aware that breast MRI is not ordered at this time and that Zakiya Galicia would like her to see Genetic Counselor first and then if imaging recommended we can order.    Referral placed is correct. Breast Center referral has been cancelled.    Patient verbalized understanding and agreed with plan.          LIS Kaba, RN, N  [facility]  Office: [phone]  Fax: [phone]

## 2020-01-13 NOTE — TELEPHONE ENCOUNTER
Mohini, please be sure this is the right referral?  Also can you please be sure the patient is aware that she is not getting a breast MRI ordered from but rather having a genetic counseling referral and further imaging dictated from that visit.      Zakiya Galicia, FNP-BC

## 2020-01-13 NOTE — TELEPHONE ENCOUNTER
Called #   Telephone Information:   Mobile [phone]     Pt contacted and advised of referrals. Pt was shoveling at the time of the call and will call back for telephone number if not heard from genetic counseling.      Mikal Lenz RN   [facility]

## 2020-01-13 NOTE — TELEPHONE ENCOUNTER
Patient returning phone call, no RN available. Please try her again.    Ph: [phone]    Shanel OCHOA  Patient Representative - [facility]

## 2020-01-13 NOTE — TELEPHONE ENCOUNTER
Attempt #2  Called patient @ # below - Left a non-detailed message to call back and speak with any triage nurse.    Sunshine Tapia RN  [facility]

## 2020-01-16 NOTE — PATIENT INSTRUCTIONS
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  Treatment History:  -6/2017: Loop electrosurgical excisional procedure (LEEP).      PLAN:  1) AIS: I will release the results of the Pap, HPV tests and endocervical curettage to [facility]. Please call if you have not received any results after 2 weeks.  Return to the gyn onc clinic in 6 months for your next surveillance visit.    Surveillance as follows: every 6 months x3 years (until 6/2020), then annually x2 years, then every 3 years indefinitely as long as testing remains negative, with Pap test + HPV test + endocervical curettage at each visit.    Plan for definitive treatment with hysterectomy after completion of childbearing, although if surveillance testing remains negative >5 years, can discuss risks/benefits of surgical management.    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 Recommend your mother call her genetic counselor every 1-2 years for updates on classification of this gene variant. If reclassified as a variant which increases cancer risk, then recommend that you get testing for the same gene mutation.  Given family history, you may pursue genetic counseling, possible testing. Please call if you would like a referral.    3) Fibroglandular breasts: Continue monitoring for changes, with imaging as indicated.    4) Prescriptions: Ativan x1 for next exam.    Ling Saleh MD, MS, FACOG, FACS  1/17/2020  10:32 AM

## 2020-01-16 NOTE — PROGRESS NOTES
Follow Up Note on Referred Patient       Referring Provider:  Dr. Jerri Davison MD  [facility] OBGYN CONSULT  [address] [address]       RE: Kenna Carballo  : 1985  CHERRY: 2020      Chief Complaint:  History of Adenocarcinoma in situ of the cervix. Surveillance visit.      History of Present Illness:  Ms.Kristi ASYA Carballo is a 34 year old female returning to the Gynecologic Cancer Clinic at the [facility] for surveillance of cervical adenocarcinoma in situ.  History as follows:    2017:  LEEP.  -Final pathology=AIS, CIN3 with positive endocervical margin.    10/23/17:  Pap test=ASCUS, hrHPV-, endocervical curettings-.    18:  Endocervical curettings-    18, 19:   Pap test=NILM, hrHPV-, endocervical curettings-.      Interval History:  Kenna returns to the gyn onc clinic today for her scheduled surveillance visit. Kenna has no concerns related to her history of AIS. No irregular bleeding. No postcoital bleeding. She does note that she has a small cyst on the inner right thigh/groin which increases when she exercises or sweats, then decreases in size. She feels like it is similar to a zit, but that it never comes to a point that she can pop it. She has concerns about her breast. She notes a new lump in the upper outer quadrant of the right breast. She has had a mammogram and ultrasound both of which showed fibroglandular changes, but she reports that her mother's breast cancer, also diagnosed in her 30s, was missed on a mammogram so she is especially anxious.       Past Medical History:  Past Medical History:   Diagnosis Date     Adenocarcinoma in situ (AIS) of uterine cervix 2017     SVT (supraventricular tachycardia) (H)     s/p ablation       Past Surgical History:  Past Surgical History:   Procedure Laterality Date     CARDIAC SURGERY      ablation for svt      SECTION N/A 2015    Procedure:  SECTION;   Surgeon: Coco Cross MD;  Location:  L+D     LEEP [place], CERVICAL  06/2017    For AIS, CIN3       Allergies:   Allergies   Allergen Reactions     Bee Pollen Swelling     Latex        Social History:  Social History     Tobacco Use     Smoking status: Never Smoker     Smokeless tobacco: Never Used   Substance Use Topics     Alcohol use: Yes     Alcohol/week: 0.0 standard drinks     Comment: 2-4 per wk       History   Drug Use No       Family History:     The patient's family history is notable for first- and second-degree maternal relatives and a second-degree paternal relative with breast cancer. Mother's genetic testing significant for a VUS in MSH2.  Family History   Problem Relation Age of Onset     Breast Cancer Mother 36        Genetic testing: VUS MSH2     Breast Cancer Paternal Grandmother 45     Breast Cancer Maternal Aunt 65       Physical Exam:     VS: /88   Pulse 78   Temp 98.7  F (37.1  C) (Oral)   Resp 16   Wt 72.6 kg (160 lb)   LMP 01/01/2020 (Approximate)   SpO2 100%   Breastfeeding No   BMI 26.63 kg/m      General Appearance: healthy and alert, no distress    Breast: Symmetric in appearance, no skin changes, no nipple retraction.  No palpable masses, no nipple discharge. Right breast denser, especially at the upper outer quadrant, but no dominant mass.  No axillary lymphadenopathy.    Genitourinary: External genitalia and urethral meatus appears normal.  Vagina is smooth without nodularity or masses.  Cervix appears grossly normal, small amount of old blood. Pap & HPV test obtained. Right inner thigh with a small, soft cystic structure, likely an inclusion cyst or plugged sweat duct, not suspicious for dysplasia or cancer.      Procedure Risk Statement:   The patient was counseled regarding risks, benefits and alternatives to endocervical curettage.  Risks discussed include but not limited to:  Bleeding; infection; injury to adjacent organs; inadequate sample or false  negative result.  The patient's questions were answered, and informed consent signed.      Procedure:   After patient identification was confirmed and informed consent signed, a single tooth tenaculum was placed on the anterior lip of the cervix to obtain access to the cervical os. Sharp endocervical curettage followed by curettage with the cytobrush performed. The tenaculum was removed without significant bleeding. The patient tolerated the procedure well.      Laboratory Examination:  Urine pregnancy test=negative.      Performance Status:  ECOG Grade 0.      Assessment:    Kenna Carballo is a 34 year old woman with history of CIN2-3 with positive margins and focus of AIS in 2017, currently without evidence of disease with negative HPV testing since dysplasia diagnosis.    Fertility desires: Desires future fertility.    Fibrocystic breasts, confirmed by imaging.  Family history significant for premenopausal breast cancer.    A total of 30 minutes was spent with the patient, 20 minutes of which were spent in counseling the patient and/or treatment planning, 10 minutes of which were spent in the above procedure.      Plan:     1.)    CIN3, AIS: I will release the results of the Pap, HPV test and endocervical curettage to [employer].   Assuming all tests are normal/negative, Kenna will return to the gyn onc clinic in 6 months for her next surveillance visit.    Surveillance as follows: Pap + HPV co-test and endocervical curettage every 6 months x3 years (until 6/2020), then annually x2 years, then can extend to every 3 years indefinitely if HPV testing remains negative.   Plan for definitive treatment with hysterectomy after completion of childbearing, although can discuss the risks/benefits if surveillance testing remains negative.     2.) Genetic risk factors were assessed. 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 No additional testing for family members is generally recommended for VUS, but Kenna garay  considering testing if covered by her insurance. Continue annual breast cancer screening with her primary care provider.    3.) Labs and/or tests ordered include: 1)  Pap test, HPV test, pathology of endocervical curettings. 2) Pre-procedure urine pregnancy test.     4.) Health maintenance issues addressed today include continuing breast surveillance, with repeat imaging if there are any changes.    5.) Prescriptions: Lorazepam, #1, no refills, to be taken prior to next surveillance visit.      Ling Saleh MD, MS, FACOG, FACS  1/17/2020  10:35 AM          CC  Patient Care Team:  Trang Alvares MD as PCP - General (OB/Gyn)  Jerzy Harkins MD as PCP - Obstetrics/Gynecology (Oncology)  Jyoti Vasquez RN as Registered Nurse  Zakiya Galicia APRN CNP as Assigned PCP  FRANKO RAMIREZ

## 2020-01-17 ENCOUNTER — APPOINTMENT (OUTPATIENT)
Dept: LAB | Facility: CLINIC | Age: 35
End: 2020-01-17
Payer: COMMERCIAL

## 2020-01-17 ENCOUNTER — OFFICE VISIT (OUTPATIENT)
Dept: ONCOLOGY | Facility: CLINIC | Age: 35
End: 2020-01-17
Attending: OBSTETRICS & GYNECOLOGY
Payer: COMMERCIAL

## 2020-01-17 VITALS
WEIGHT: 160 LBS | RESPIRATION RATE: 16 BRPM | OXYGEN SATURATION: 100 % | HEART RATE: 78 BPM | BODY MASS INDEX: 26.63 KG/M2 | TEMPERATURE: 98.7 F | SYSTOLIC BLOOD PRESSURE: 129 MMHG | DIASTOLIC BLOOD PRESSURE: 88 MMHG

## 2020-01-17 DIAGNOSIS — F41.9 ANXIETY: ICD-10-CM

## 2020-01-17 DIAGNOSIS — D06.9 ADENOCARCINOMA IN SITU (AIS) OF UTERINE CERVIX: Primary | ICD-10-CM

## 2020-01-17 DIAGNOSIS — N60.11 FIBROCYSTIC CHANGES OF RIGHT BREAST: ICD-10-CM

## 2020-01-17 DIAGNOSIS — Z01.818 PRE-OP EXAM: ICD-10-CM

## 2020-01-17 LAB — HCG UR QL: NEGATIVE

## 2020-01-17 PROCEDURE — 87624 HPV HI-RISK TYP POOLED RSLT: CPT | Performed by: OBSTETRICS & GYNECOLOGY

## 2020-01-17 PROCEDURE — 81025 URINE PREGNANCY TEST: CPT | Performed by: OBSTETRICS & GYNECOLOGY

## 2020-01-17 PROCEDURE — G0463 HOSPITAL OUTPT CLINIC VISIT: HCPCS | Mod: 25

## 2020-01-17 PROCEDURE — G0463 HOSPITAL OUTPT CLINIC VISIT: HCPCS | Mod: ZF

## 2020-01-17 PROCEDURE — 99213 OFFICE O/P EST LOW 20 MIN: CPT | Mod: 25 | Performed by: OBSTETRICS & GYNECOLOGY

## 2020-01-17 PROCEDURE — 57505 ENDOCERVICAL CURETTAGE: CPT

## 2020-01-17 PROCEDURE — 88305 TISSUE EXAM BY PATHOLOGIST: CPT | Performed by: OBSTETRICS & GYNECOLOGY

## 2020-01-17 PROCEDURE — 57505 ENDOCERVICAL CURETTAGE: CPT | Performed by: OBSTETRICS & GYNECOLOGY

## 2020-01-17 PROCEDURE — 88175 CYTOPATH C/V AUTO FLUID REDO: CPT | Performed by: OBSTETRICS & GYNECOLOGY

## 2020-01-17 RX ORDER — LORAZEPAM 1 MG/1
TABLET ORAL
Qty: 1 TABLET | Refills: 0 | Status: SHIPPED | OUTPATIENT
Start: 2020-01-17

## 2020-01-17 RX ORDER — LORAZEPAM 1 MG/1
TABLET ORAL
Qty: 1 TABLET | Refills: 0
Start: 2020-01-17 | End: 2020-01-17

## 2020-01-17 ASSESSMENT — PAIN SCALES - GENERAL: PAINLEVEL: MILD PAIN (3)

## 2020-01-17 NOTE — LETTER
2020       RE: Kenna Carballo  [address]  [place] [address]     Dear Colleague,    Thank you for referring your patient, Kenna Carballo, to the [facility]. Please see a copy of my visit note below.                          Follow Up Note on Referred Patient       Referring Provider:  Dr. Jerri Davison MD  [facility] OBGYN CONSULT  [address] [facility] [address]  [place] [address]       RE: Kenna Carballo  : 1985  CHERRY: 2020      Chief Complaint:  History of Adenocarcinoma in situ of the cervix. Surveillance visit.      History of Present Illness:  Ms.Kristi ASYA Carballo is a 34 year old female returning to the Gynecologic Cancer Clinic at the [facility] for surveillance of cervical adenocarcinoma in situ.  History as follows:    2017:  LEEP.  -Final pathology=AIS, CIN3 with positive endocervical margin.    10/23/17:  Pap test=ASCUS, hrHPV-, endocervical curettings-.    18:  Endocervical curettings-    18, 19:   Pap test=NILM, hrHPV-, endocervical curettings-.      Interval History:  Kenna returns to the gyn onc clinic today for her scheduled surveillance visit. Kenna has no concerns related to her history of AIS. No irregular bleeding. No postcoital bleeding. She does note that she has a small cyst on the inner right thigh/groin which increases when she exercises or sweats, then decreases in size. She feels like it is similar to a zit, but that it never comes to a point that she can pop it. She has concerns about her breast. She notes a new lump in the upper outer quadrant of the right breast. She has had a mammogram and ultrasound both of which showed fibroglandular changes, but she reports that her mother's breast cancer, also diagnosed in her 30s, was missed on a mammogram so she is especially anxious.       Past Medical History:  Past Medical History:   Diagnosis Date     Adenocarcinoma in situ (AIS) of uterine cervix 2017     T  (supraventricular tachycardia) (H)     s/p ablation       Past Surgical History:  Past Surgical History:   Procedure Laterality Date     CARDIAC SURGERY      ablation for svt      SECTION N/A 2015    Procedure:  SECTION;  Surgeon: Coco Cross MD;  Location:  L+D     [facility] [place], CERVICAL  2017    For AIS, CIN3       Allergies:   Allergies   Allergen Reactions     Bee Pollen Swelling     Latex        Social History:  Social History     Tobacco Use     Smoking status: Never Smoker     Smokeless tobacco: Never Used   Substance Use Topics     Alcohol use: Yes     Alcohol/week: 0.0 standard drinks     Comment: 2-4 per wk       History   Drug Use No       Family History:     The patient's family history is notable for first- and second-degree maternal relatives and a second-degree paternal relative with breast cancer. Mother's genetic testing significant for a VUS in MSH2.  Family History   Problem Relation Age of Onset     Breast Cancer Mother 36        Genetic testing: VUS MSH2     Breast Cancer Paternal Grandmother 45     Breast Cancer Maternal Aunt 65       Physical Exam:     VS: /88   Pulse 78   Temp 98.7  F (37.1  C) (Oral)   Resp 16   Wt 72.6 kg (160 lb)   LMP 2020 (Approximate)   SpO2 100%   Breastfeeding No   BMI 26.63 kg/m       General Appearance: healthy and alert, no distress    Breast: Symmetric in appearance, no skin changes, no nipple retraction.  No palpable masses, no nipple discharge. Right breast denser, especially at the upper outer quadrant, but no dominant mass.  No axillary lymphadenopathy.    Genitourinary: External genitalia and urethral meatus appears normal.  Vagina is smooth without nodularity or masses.  Cervix appears grossly normal, small amount of old blood. Pap & HPV test obtained. Right inner thigh with a small, soft cystic structure, likely an inclusion cyst or plugged sweat duct, not suspicious for dysplasia or  cancer.      Procedure Risk Statement:   The patient was counseled regarding risks, benefits and alternatives to endocervical curettage.  Risks discussed include but not limited to:  Bleeding; infection; injury to adjacent organs; inadequate sample or false negative result.  The patient's questions were answered, and informed consent signed.      Procedure:   After patient identification was confirmed and informed consent signed, a single tooth tenaculum was placed on the anterior lip of the cervix to obtain access to the cervical os. Sharp endocervical curettage followed by curettage with the cytobrush performed. The tenaculum was removed without significant bleeding. The patient tolerated the procedure well.      Laboratory Examination:  Urine pregnancy test=negative.      Performance Status:  ECOG Grade 0.      Assessment:    Kenna Carballo is a 34 year old woman with history of CIN2-3 with positive margins and focus of AIS in 2017, currently without evidence of disease with negative HPV testing since dysplasia diagnosis.    Fertility desires: Desires future fertility.    Fibrocystic breasts, confirmed by imaging.  Family history significant for premenopausal breast cancer.    A total of 30 minutes was spent with the patient, 20 minutes of which were spent in counseling the patient and/or treatment planning, 10 minutes of which were spent in the above procedure.      Plan:     1.)    CIN3, AIS: I will release the results of the Pap, HPV test and endocervical curettage to [employer].   Assuming all tests are normal/negative, Kenna will return to the gyn onc clinic in 6 months for her next surveillance visit.    Surveillance as follows: Pap + HPV co-test and endocervical curettage every 6 months x3 years (until 6/2020), then annually x2 years, then can extend to every 3 years indefinitely if HPV testing remains negative.   Plan for definitive treatment with hysterectomy after completion of childbearing, although can  discuss the risks/benefits if surveillance testing remains negative.     2.) Genetic risk factors were assessed. 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 No additional testing for family members is generally recommended for VUS, but Kenna is considering testing if covered by her insurance. Continue annual breast cancer screening with her primary care provider.    3.) Labs and/or tests ordered include: 1)  Pap test, HPV test, pathology of endocervical curettings. 2) Pre-procedure urine pregnancy test.     4.) Health maintenance issues addressed today include continuing breast surveillance, with repeat imaging if there are any changes.    5.) Prescriptions: Lorazepam, #1, no refills, to be taken prior to next surveillance visit.      Ling Saleh MD, MS, FACOG, FACS  1/17/2020  10:35 AM      CC  Patient Care Team:  Trang Alvares MD as PCP - General (OB/Gyn)  Jerzy Harkins MD as PCP - Obstetrics/Gynecology (Oncology)  Jyoti Vasquez RN as Registered Nurse  Zakiya Galicia APRN CNP as Assigned PCP  FRANKO RAMIREZ

## 2020-01-17 NOTE — NURSING NOTE
Return appt 6 months  ECC and pap smear done today, orders entered, specimens sent to pathology/cytology

## 2020-01-17 NOTE — NURSING NOTE
"Oncology Rooming Note    January 17, 2020 9:49 AM   Kenna Carballo is a 34 year old female who presents for:    Chief Complaint   Patient presents with     Oncology Clinic Visit     Return Cervical Ca; Colpo     Initial Vitals: /88   Pulse 78   Temp 98.7  F (37.1  C) (Oral)   Resp 16   Wt 72.6 kg (160 lb)   LMP 01/01/2020 (Approximate)   SpO2 100%   Breastfeeding No   BMI 26.63 kg/m   Estimated body mass index is 26.63 kg/m  as calculated from the following:    Height as of 11/26/19: 1.651 m (5' 5\").    Weight as of this encounter: 72.6 kg (160 lb). Body surface area is 1.82 meters squared.  Mild Pain (3) Comment: right breast    Patient's last menstrual period was 01/01/2020 (approximate).  Allergies reviewed: Yes  Medications reviewed: Yes    Medications: MEDICATION REFILLS NEEDED TODAY. Provider was notified.  Pharmacy name entered into [facility]: [id number] [place] [phone] [facility] [address] AT [place] [id number] & [facility]    Clinical concerns: Refill on Ativan; right breast pain; Colpo      Meg Yu CMA              "

## 2020-01-20 LAB — COPATH REPORT: NORMAL

## 2020-01-22 LAB
COPATH REPORT: NORMAL
PAP: NORMAL

## 2020-01-24 ENCOUNTER — TELEPHONE (OUTPATIENT)
Dept: ONCOLOGY | Facility: CLINIC | Age: 35
End: 2020-01-24

## 2020-01-24 NOTE — TELEPHONE ENCOUNTER
Called to inform Kenna that her Pap, HPV test and endocervical curettings are all normal. Continue plan for surveillance exam in 6 months.    Ling Saleh MD, MS, FACOG, FACS  1/24/2020  8:57 AM

## 2020-11-16 ENCOUNTER — HEALTH MAINTENANCE LETTER (OUTPATIENT)
Age: 35
End: 2020-11-16

## 2021-01-15 ENCOUNTER — HEALTH MAINTENANCE LETTER (OUTPATIENT)
Age: 36
End: 2021-01-15

## 2021-02-07 ENCOUNTER — HEALTH MAINTENANCE LETTER (OUTPATIENT)
Age: 36
End: 2021-02-07

## 2021-09-18 ENCOUNTER — HEALTH MAINTENANCE LETTER (OUTPATIENT)
Age: 36
End: 2021-09-18

## 2022-03-05 ENCOUNTER — HEALTH MAINTENANCE LETTER (OUTPATIENT)
Age: 37
End: 2022-03-05

## 2022-11-19 ENCOUNTER — HEALTH MAINTENANCE LETTER (OUTPATIENT)
Age: 37
End: 2022-11-19

## 2023-01-16 ENCOUNTER — LAB REQUISITION (OUTPATIENT)
Dept: LAB | Facility: CLINIC | Age: 38
End: 2023-01-16

## 2023-01-16 DIAGNOSIS — Z01.419 ENCOUNTER FOR GYNECOLOGICAL EXAMINATION (GENERAL) (ROUTINE) WITHOUT ABNORMAL FINDINGS: ICD-10-CM

## 2023-01-16 DIAGNOSIS — Z11.3 ENCOUNTER FOR SCREENING FOR INFECTIONS WITH A PREDOMINANTLY SEXUAL MODE OF TRANSMISSION: ICD-10-CM

## 2023-01-16 PROCEDURE — 87624 HPV HI-RISK TYP POOLED RSLT: CPT | Performed by: OBSTETRICS & GYNECOLOGY

## 2023-01-16 PROCEDURE — 87491 CHLMYD TRACH DNA AMP PROBE: CPT | Performed by: OBSTETRICS & GYNECOLOGY

## 2023-01-16 PROCEDURE — G0145 SCR C/V CYTO,THINLAYER,RESCR: HCPCS | Performed by: OBSTETRICS & GYNECOLOGY

## 2023-01-17 LAB
C TRACH DNA SPEC QL PROBE+SIG AMP: NEGATIVE
N GONORRHOEA DNA SPEC QL NAA+PROBE: NEGATIVE

## 2023-01-18 LAB
BKR LAB AP GYN ADEQUACY: NORMAL
BKR LAB AP GYN INTERPRETATION: NORMAL
BKR LAB AP HPV REFLEX: NORMAL
BKR LAB AP LMP: NORMAL
BKR LAB AP PREVIOUS ABNL DX: NORMAL
BKR LAB AP PREVIOUS ABNORMAL: NORMAL
PATH REPORT.COMMENTS IMP SPEC: NORMAL
PATH REPORT.COMMENTS IMP SPEC: NORMAL
PATH REPORT.RELEVANT HX SPEC: NORMAL

## 2023-01-20 LAB
HUMAN PAPILLOMA VIRUS 16 DNA: NEGATIVE
HUMAN PAPILLOMA VIRUS 18 DNA: NEGATIVE
HUMAN PAPILLOMA VIRUS FINAL DIAGNOSIS: NORMAL
HUMAN PAPILLOMA VIRUS OTHER HR: NEGATIVE

## 2023-04-09 ENCOUNTER — HEALTH MAINTENANCE LETTER (OUTPATIENT)
Age: 38
End: 2023-04-09
